# Patient Record
Sex: FEMALE | Race: WHITE | Employment: FULL TIME | URBAN - METROPOLITAN AREA
[De-identification: names, ages, dates, MRNs, and addresses within clinical notes are randomized per-mention and may not be internally consistent; named-entity substitution may affect disease eponyms.]

---

## 2017-04-17 ENCOUNTER — OFFICE VISIT (OUTPATIENT)
Dept: FAMILY MEDICINE | Facility: CLINIC | Age: 37
End: 2017-04-17
Payer: COMMERCIAL

## 2017-04-17 VITALS
BODY MASS INDEX: 39.49 KG/M2 | DIASTOLIC BLOOD PRESSURE: 78 MMHG | OXYGEN SATURATION: 97 % | WEIGHT: 237 LBS | HEART RATE: 108 BPM | TEMPERATURE: 98.2 F | HEIGHT: 65 IN | SYSTOLIC BLOOD PRESSURE: 122 MMHG

## 2017-04-17 DIAGNOSIS — Z00.00 ROUTINE GENERAL MEDICAL EXAMINATION AT A HEALTH CARE FACILITY: ICD-10-CM

## 2017-04-17 DIAGNOSIS — Z13.220 SCREENING FOR LIPID DISORDERS: ICD-10-CM

## 2017-04-17 DIAGNOSIS — Z13.1 SCREENING FOR DIABETES MELLITUS: ICD-10-CM

## 2017-04-17 DIAGNOSIS — Z01.419 ENCOUNTER FOR GYNECOLOGICAL EXAMINATION WITHOUT ABNORMAL FINDING: ICD-10-CM

## 2017-04-17 PROCEDURE — 87624 HPV HI-RISK TYP POOLED RSLT: CPT | Performed by: FAMILY MEDICINE

## 2017-04-17 PROCEDURE — 99385 PREV VISIT NEW AGE 18-39: CPT | Performed by: FAMILY MEDICINE

## 2017-04-17 PROCEDURE — 80061 LIPID PANEL: CPT | Performed by: FAMILY MEDICINE

## 2017-04-17 PROCEDURE — G0145 SCR C/V CYTO,THINLAYER,RESCR: HCPCS | Performed by: FAMILY MEDICINE

## 2017-04-17 PROCEDURE — 36415 COLL VENOUS BLD VENIPUNCTURE: CPT | Performed by: FAMILY MEDICINE

## 2017-04-17 PROCEDURE — 82947 ASSAY GLUCOSE BLOOD QUANT: CPT | Performed by: FAMILY MEDICINE

## 2017-04-17 NOTE — MR AVS SNAPSHOT
After Visit Summary   4/17/2017    Opal Muller    MRN: [id number]           Patient Information     Date Of Birth          1980        Visit Information        Provider Department      4/17/2017 3:40 PM Richar Powell MD [facility]        Today's Diagnoses     Routine general medical examination at a health care facility        Screening for diabetes mellitus        Screening for lipid disorders        Encounter for gynecological examination without abnormal finding          Care Instructions      Preventive Health Recommendations  Female Ages 26 - 39  Yearly exam:   See your health care provider every year in order to    Review health changes.     Discuss preventive care.      Review your medicines if you your doctor has prescribed any.    Until age 30: Get a Pap test every three years (more often if you have had an abnormal result).    After age 30: Talk to your doctor about whether you should have a Pap test every 3 years or have a Pap test with HPV screening every 5 years.   You do not need a Pap test if your uterus was removed (hysterectomy) and you have not had cancer.  You should be tested each year for STDs (sexually transmitted diseases), if you're at risk.   Talk to your provider about how often to have your cholesterol checked.  If you are at risk for diabetes, you should have a diabetes test (fasting glucose).  Shots: Get a flu shot each year. Get a tetanus shot every 10 years.   Nutrition:     Eat at least 5 servings of fruits and vegetables each day.    Eat whole-grain bread, whole-wheat pasta and brown rice instead of white grains and rice.    Talk to your provider about Calcium and Vitamin D.     Lifestyle    Exercise at least 150 minutes a week (30 minutes a day, 5 days of the week). This will help you control your weight and prevent disease.    Limit alcohol to one drink per day.    No smoking.     Wear sunscreen to prevent skin cancer.    See your  "dentist every six months for an exam and cleaning.          Follow-ups after your visit        Who to contact     If you have questions or need follow up information about today's clinic visit or your schedule please contact [facility] directly at [phone].  Normal or non-critical lab and imaging results will be communicated to you by MyChart, letter or phone within 4 business days after the clinic has received the results. If you do not hear from us within 7 days, please contact the clinic through MyChart or phone. If you have a critical or abnormal lab result, we will notify you by phone as soon as possible.  Submit refill requests through [employer] or call your pharmacy and they will forward the refill request to us. Please allow 3 business days for your refill to be completed.          Additional Information About Your Visit        MyChart Information     Wisr lets you send messages to your doctor, view your test results, renew your prescriptions, schedule appointments and more. To sign up, go to [email or web address] . Click on \"Log in\" on the left side of the screen, which will take you to the Welcome page. Then click on \"Sign up Now\" on the right side of the page.     You will be asked to enter the access code listed below, as well as some personal information. Please follow the directions to create your username and password.     Your access code is: [email or web address]  Expires: 8/15/2017  8:32 AM     Your access code will  in 90 days. If you need help or a new code, please call your [facility] clinic or [phone].        Care EveryWhere ID     This is your Care EveryWhere ID. This could be used by other organizations to access your [facility] medical records  [id number]        Your Vitals Were     Pulse Temperature Height Last Period Pulse Oximetry BMI (Body Mass Index)    108 98.2  F (36.8  C) (Tympanic) 5' 5\" (1.651 m) 2017 (Approximate) 97% 39.44 kg/m2       Blood Pressure " from Last 3 Encounters:   04/17/17 122/78    Weight from Last 3 Encounters:   04/17/17 237 lb (107.5 kg)              We Performed the Following     Glucose     HPV High Risk Types DNA Cervical     Lipid Profile (Chol, Trig, HDL, LDL calc)     Pap imaged thin layer screen with HPV - recommended age 30 - 65 years (select HPV order below)        Primary Care Provider Office Phone # Fax #    Deqa Huang Powell [phone]       [facility] [address] [facility] [address]        Thank you!     Thank you for choosing [facility]  for your care. Our goal is always to provide you with excellent care. Hearing back from our patients is one way we can continue to improve our services. Please take a few minutes to complete the written survey that you may receive in the mail after your visit with us. Thank you!             Your Updated Medication List - Protect others around you: Learn how to safely use, store and throw away your medicines at www.disposemymeds.org.      Notice  As of 4/17/2017 11:59 PM    You have not been prescribed any medications.

## 2017-04-17 NOTE — LETTER
May 17, 2017    Opal Muller  [address]  [facility] [address]    Dear Opal,  We are happy to inform you that your PAP smear result from 04/17/17 is normal.  We are now able to do a follow up test on PAP smears. The DNA test is for HPV (Human Papilloma Virus). Cervical cancer is closely linked with certain types of HPV. Your result showed no evidence of high risk HPV.  Therefore we recommend you return in 5 years for your next pap smear and HPV test.  You will still need to return to the clinic every year for an annual exam and other preventive tests.  Please contact the clinic at [phone] with any questions.  Sincerely,    Richar Powell MD/chela

## 2017-04-17 NOTE — LETTER
[facility]   [address] [place]   [address]  [phone]    April 20, 2017      Opal ERMIAS Renzo  [address] [facility] [address]              Dear Ms. Muller,    Here are your results for your recent labs.   Your blood sugar, cholesterol and LDL were all normal.   Please call or message me if you have questions or concerns.    Resulted Orders   Lipid Profile (Chol, Trig, HDL, LDL calc)   Result Value Ref Range    Cholesterol 171 <200 mg/dL    Triglycerides 151 (H) <150 mg/dL      Comment:      Borderline high:  150-199 mg/dl   High:             200-499 mg/dl   Very high:       >499 mg/dl   Fasting specimen      HDL Cholesterol 70 >49 mg/dL    LDL Cholesterol Calculated 71 <100 mg/dL      Comment:      Desirable:       <100 mg/dl    Non HDL Cholesterol 101 <130 mg/dL   Glucose   Result Value Ref Range    Glucose 81 70 - 99 mg/dL      Comment:      Fasting specimen             Sincerely,    Richar Powell MD/nr

## 2017-04-17 NOTE — PROGRESS NOTES
SUBJECTIVE:     CC: Opal Muller is an 36 year old woman who presents for preventive health visit.     Physical   Annual:     Getting at least 3 servings of Calcium per day::  Yes    Bi-annual eye exam::  NO    Dental care twice a year::  NO    Sleep apnea or symptoms of sleep apnea::  None    Diet::  Regular (no restrictions)    Frequency of exercise::  1 day/week    Duration of exercise::  15-30 minutes    Taking medications regularly::  Not Applicable    Additional concerns today::  No        Today's PHQ-2 Score:   PHQ-2 ( 1999 [employer]) 4/17/2017   Little interest or pleasure in doing things Several days   Feeling down, depressed or hopeless Several days   PHQ-2 Score 2       Abuse: Current or Past(Physical, Sexual or Emotional)- No  Do you feel safe in your environment - Yes    Social History   Substance Use Topics     Smoking status: Passive Smoke Exposure - Never Smoker     Smokeless tobacco: Not on file      Comment: quit      Alcohol use Not on file     The patient does not drink >3 drinks per day nor >7 drinks per week.    No results for input(s): CHOL, HDL, LDL, TRIG, CHOLHDLRATIO, NHDL in the last 04850 hours.    Reviewed orders with patient.  Reviewed health maintenance and updated orders accordingly - Yes    Mammo Decision Support:  Mammogram not appropriate for this patient based on age.    Pertinent mammograms are reviewed under the imaging tab.  History of abnormal Pap smear: NO - age 30-65 PAP every 5 years with negative HPV co-testing recommended    Reviewed and updated as needed this visit by clinical staff  Tobacco  Allergies  Med Hx  Surg Hx  Fam Hx  Soc Hx        Reviewed and updated as needed this visit by Provider            ROS:  C: NEGATIVE for fever, chills, change in weight  I: NEGATIVE for worrisome rashes, moles or lesions  E: NEGATIVE for vision changes or irritation  ENT: NEGATIVE for ear, mouth and throat problems  R: NEGATIVE for significant cough or SOB  B: NEGATIVE for  masses, tenderness or discharge  CV: NEGATIVE for chest pain, palpitations or peripheral edema  GI: NEGATIVE for nausea, abdominal pain, heartburn, or change in bowel habits  : NEGATIVE for unusual urinary or vaginal symptoms. Periods are regular.  M: NEGATIVE for significant arthralgias or myalgia  N: NEGATIVE for weakness, dizziness or paresthesias  P: NEGATIVE for changes in mood or affect    Problem list, Medication list, Allergies, and Medical/Social/Surgical histories reviewed in EPIC and updated as appropriate.  OBJECTIVE:     /78 (BP Location: Right arm, Patient Position: Chair, Cuff Size: Adult Regular)  Pulse 108  Temp 98.2  F (36.8  C) (Tympanic)  Wt 237 lb (107.5 kg)  LMP 04/01/2017 (Approximate)  SpO2 97%  EXAM:  GENERAL: healthy, alert and no distress  EYES: Eyes grossly normal to inspection, PERRL and conjunctivae and sclerae normal  HENT: ear canals and TM's normal, nose and mouth without ulcers or lesions  NECK: no adenopathy, no asymmetry, masses, or scars and thyroid normal to palpation  RESP: lungs clear to auscultation - no rales, rhonchi or wheezes  CV: regular rate and rhythm, normal S1 S2, no S3 or S4, no murmur  ABDOMEN: soft, nontender, no hepatosplenomegaly, no masses and bowel sounds normal   (female): normal female external genitalia, normal urethral meatus, vaginal mucosa pink, moist, well rugated, and normal cervix/adnexa/uterus without masses or discharge  MS: no gross musculoskeletal defects noted, no edema  SKIN: no suspicious lesions or rashes  NEURO: Normal strength and tone, mentation intact and speech normal  PSYCH: mentation appears normal, affect normal/bright    ASSESSMENT/PLAN:       1. Routine general medical examination at a health care facility  - see below     2. Screening for diabetes mellitus  - Glucose    3. Screening for lipid disorders  - Lipid Profile (Chol, Trig, HDL, LDL calc)    4. Encounter for gynecological examination without abnormal  finding  - Pap imaged thin layer screen with HPV - recommended age 30 - 65 years (select HPV order below)  - HPV High Risk Types DNA Cervical    COUNSELING:  Reviewed preventive health counseling, as reflected in patient instructions       Regular exercise       Healthy diet/nutrition         reports that she is a non-smoker but has been exposed to tobacco smoke. She does not have any smokeless tobacco history on file.    There is no height or weight on file to calculate BMI.   Weight management plan: Discussed healthy diet and exercise guidelines and patient will follow up in 12 months in clinic to re-evaluate.    Counseling Resources:  ATP IV Guidelines  Pooled Cohorts Equation Calculator  Breast Cancer Risk Calculator  FRAX Risk Assessment  ICSI Preventive Guidelines  Dietary Guidelines for Americans, 2010  [place]'s MyPlate  ASA Prophylaxis  Lung CA Screening    Deisha Powell MD  [facility]  Answers for HPI/ROS submitted by the patient on 4/17/2017   Q1: Little interest or pleasure in doing things: 1=Several days  Q2: Feeling down, depressed or hopeless: 1=Several days  PHQ-2 Score: 2

## 2017-04-17 NOTE — NURSING NOTE
Chief Complaint   Patient presents with     Physical     new patient       Initial /78 (BP Location: Right arm, Patient Position: Chair, Cuff Size: Adult Regular)  Pulse 108  Temp 98.2  F (36.8  C) (Tympanic)  Wt 237 lb (107.5 kg)  LMP 04/01/2017 (Approximate)  SpO2 97% There is no height or weight on file to calculate BMI.  Medication Reconciliation: complete     Elena Wells MA

## 2017-04-18 LAB
CHOLEST SERPL-MCNC: 171 MG/DL
GLUCOSE SERPL-MCNC: 81 MG/DL (ref 70–99)
HDLC SERPL-MCNC: 70 MG/DL
LDLC SERPL CALC-MCNC: 71 MG/DL
NONHDLC SERPL-MCNC: 101 MG/DL
TRIGL SERPL-MCNC: 151 MG/DL

## 2017-04-19 LAB
COPATH REPORT: NORMAL
PAP: NORMAL

## 2017-04-20 NOTE — PROGRESS NOTES
Please send the letter to the patient with the following.         Here are your results for your recent labs.   Your blood sugar, cholesterol and LDL were all normal.   Please call or message me if you have questions or concerns.

## 2017-04-21 ENCOUNTER — TELEPHONE (OUTPATIENT)
Dept: FAMILY MEDICINE | Facility: CLINIC | Age: 37
End: 2017-04-21

## 2017-04-21 LAB
FINAL DIAGNOSIS: NORMAL
HPV HR 12 DNA CVX QL NAA+PROBE: NEGATIVE
HPV16 DNA SPEC QL NAA+PROBE: NEGATIVE
HPV18 DNA SPEC QL NAA+PROBE: NEGATIVE
SPECIMEN DESCRIPTION: NORMAL

## 2017-04-21 NOTE — TELEPHONE ENCOUNTER
Called and left message that forms are completed and place up front for pick-up.    Elena Wells MA

## 2018-04-25 ENCOUNTER — OFFICE VISIT (OUTPATIENT)
Dept: FAMILY MEDICINE | Facility: CLINIC | Age: 38
End: 2018-04-25
Payer: COMMERCIAL

## 2018-04-25 VITALS
RESPIRATION RATE: 16 BRPM | SYSTOLIC BLOOD PRESSURE: 126 MMHG | OXYGEN SATURATION: 100 % | HEART RATE: 91 BPM | HEIGHT: 65 IN | TEMPERATURE: 98.3 F | BODY MASS INDEX: 39.32 KG/M2 | DIASTOLIC BLOOD PRESSURE: 89 MMHG | WEIGHT: 236 LBS

## 2018-04-25 DIAGNOSIS — Z13.1 SCREENING FOR DIABETES MELLITUS: ICD-10-CM

## 2018-04-25 DIAGNOSIS — Z13.6 CARDIOVASCULAR SCREENING; LDL GOAL LESS THAN 130: ICD-10-CM

## 2018-04-25 DIAGNOSIS — E66.812 CLASS 2 OBESITY WITHOUT SERIOUS COMORBIDITY WITH BODY MASS INDEX (BMI) OF 38.0 TO 38.9 IN ADULT, UNSPECIFIED OBESITY TYPE: ICD-10-CM

## 2018-04-25 DIAGNOSIS — Z00.00 ENCOUNTER FOR ROUTINE ADULT HEALTH EXAMINATION WITHOUT ABNORMAL FINDINGS: Primary | ICD-10-CM

## 2018-04-25 PROCEDURE — 99395 PREV VISIT EST AGE 18-39: CPT | Performed by: NURSE PRACTITIONER

## 2018-04-25 PROCEDURE — 82947 ASSAY GLUCOSE BLOOD QUANT: CPT | Performed by: NURSE PRACTITIONER

## 2018-04-25 PROCEDURE — 80061 LIPID PANEL: CPT | Performed by: NURSE PRACTITIONER

## 2018-04-25 PROCEDURE — 36415 COLL VENOUS BLD VENIPUNCTURE: CPT | Performed by: NURSE PRACTITIONER

## 2018-04-25 NOTE — LETTER
April 26, 2018      Opal Muller  [address]  [facility] [address]        Dear ,    We are writing to inform you of your test results.    Your blood sugar was normal indicating you do not have diabetes.    Normal cholesterol.    Resulted Orders   Lipid panel reflex to direct LDL Fasting   Result Value Ref Range    Cholesterol 174 <200 mg/dL    Triglycerides 177 (H) <150 mg/dL      Comment:      Borderline high:  150-199 mg/dl  High:             200-499 mg/dl  Very high:       >499 mg/dl  Non Fasting      HDL Cholesterol 67 >49 mg/dL    LDL Cholesterol Calculated 72 <100 mg/dL      Comment:      Desirable:       <100 mg/dl    Non HDL Cholesterol 107 <130 mg/dL   Glucose   Result Value Ref Range    Glucose 88 70 - 99 mg/dL      Comment:      Non Fasting     If you have any questions or concerns, please call the clinic at the number listed above.     Sincerely,    Leatha Huitron, KIKE CNP/nr

## 2018-04-25 NOTE — PROGRESS NOTES
SUBJECTIVE:   CC: Skye Muller is an 38 year old woman who presents for preventive health visit.     Answers for HPI/ROS submitted by the patient on 2018   Annual Exam:  Getting at least 3 servings of Calcium per day:: NO  Bi-annual eye exam:: NO  Dental care twice a year:: NO  Sleep apnea or symptoms of sleep apnea:: None  Diet:: Regular (no restrictions), Other  Frequency of exercise:: None  Taking medications regularly:: Not Applicable  Medication side effects:: Not applicable  Additional concerns today:: No  PHQ-2 Score: 0      Pap 1 year ago, NIL neg HPV.      No significant medical history.  Tonsils out age 6.   13 years ago.      Physical job, works weird hours, hard to find time to exercise.  Works as replenishment manager, trucks come at 3am.  Weight is struggle for her since first child.      4 kids ages 22-13.  Sexually active, s/p tubal ligation      Today's PHQ-2 Score: No flowsheet data found.    Abuse: Current or Past(Physical, Sexual or Emotional)- No  Do you feel safe in your environment - Yes    Social History   Substance Use Topics     Smoking status: Not on file     Smokeless tobacco: Not on file     Alcohol use Not on file     If you drink alcohol do you typically have >3 drinks per day or >7 drinks per week? No                     Reviewed orders with patient.  Reviewed health maintenance and updated orders accordingly - Yes      Patient under age 50, mutual decision reflected in health maintenance.      Pertinent mammograms are reviewed under the imaging tab.  History of abnormal Pap smear: NO - age 30-65 PAP every 5 years with negative HPV co-testing recommended    Reviewed and updated as needed this visit by clinical staff         Reviewed and updated as needed this visit by Provider        No past medical history on file.   No past surgical history on file.    ROS:  CONSTITUTIONAL: NEGATIVE for fever, chills, change in weight  INTEGUMENTARU/SKIN: NEGATIVE for worrisome  rashes, moles or lesions  EYES: NEGATIVE for vision changes or irritation  ENT: NEGATIVE for ear, mouth and throat problems  RESP: NEGATIVE for significant cough or SOB  BREAST: NEGATIVE for masses, tenderness or discharge  CV: NEGATIVE for chest pain, palpitations or peripheral edema  GI: NEGATIVE for nausea, abdominal pain, heartburn, or change in bowel habits  : NEGATIVE for unusual urinary or vaginal symptoms. Periods are regular.  MUSCULOSKELETAL: NEGATIVE for significant arthralgias or myalgia  NEURO: NEGATIVE for weakness, dizziness or paresthesias  PSYCHIATRIC: NEGATIVE for changes in mood or affect    OBJECTIVE:   There were no vitals taken for this visit.  EXAM:  GENERAL: healthy, alert and no distress  EYES: Eyes grossly normal to inspection, PERRL and conjunctivae and sclerae normal  HENT: ear canals and TM's normal, nose and mouth without ulcers or lesions  NECK: no adenopathy, no asymmetry, masses, or scars and thyroid normal to palpation  RESP: lungs clear to auscultation - no rales, rhonchi or wheezes  BREAST: normal without masses, tenderness or nipple discharge and no palpable axillary masses or adenopathy  CV: regular rate and rhythm, normal S1 S2, no S3 or S4, no murmur, click or rub, no peripheral edema and peripheral pulses strong  ABDOMEN: soft, nontender, no hepatosplenomegaly, no masses and bowel sounds normal  MS: no gross musculoskeletal defects noted, no edema  SKIN: no suspicious lesions or rashes  NEURO: Normal strength and tone, mentation intact and speech normal  PSYCH: mentation appears normal, affect normal/bright    ASSESSMENT/PLAN:   (Z00.00) Encounter for routine adult health examination without abnormal findings  (primary encounter diagnosis)  Plan: pap utd, discussed different mammogram recommendations    (Z13.6) CARDIOVASCULAR SCREENING; LDL GOAL LESS THAN 130  Plan: Lipid panel reflex to direct LDL Fasting            (Z13.1) Screening for diabetes mellitus  Plan: Glucose             (E66.9,  Z68.38) Class 2 obesity without serious comorbidity with body mass index (BMI) of 38.0 to 38.9 in adult, unspecified obesity type  Plan: we had a long discussion regarding weight loss to prevent progression to diabetes.  Recommended focusing on portion control and minimizing carb intake, reviewed nutrition resource including my fitness pal, whole 30, and online weight watchers.  Recommended 30min exercise most days a week.  Discussed weight loss of 0.5-1 pound a week with overall goal of 10% weight loss.            COUNSELING:   Reviewed preventive health counseling, as reflected in patient instructions         has no tobacco history on file.    There is no height or weight on file to calculate BMI.   Weight management plan: Discussed healthy diet and exercise guidelines and patient will follow up in 12 months in clinic to re-evaluate.    Counseling Resources:  ATP IV Guidelines  Pooled Cohorts Equation Calculator  Breast Cancer Risk Calculator  FRAX Risk Assessment  ICSI Preventive Guidelines  Dietary Guidelines for Americans, 2010  USDA's MyPlate  ASA Prophylaxis  Lung CA Screening    KIKE Mendes CNP  [facility]

## 2018-04-25 NOTE — PATIENT INSTRUCTIONS
1.  Labs today  2.  Consider mammogram starting at age 40.  Call Central Scheduling [phone] to schedule mammogram  3.  The best thing you can do for your health is work on weight loss.  I recommend setting a goal of 10-20 pounds weight loss and picking a measurable structured approach.  I have had patients have success with calorie counting (my fitness pal phone chintan), online weight watchers, low carb diets, and whole 30 program.  It might take trying a few diets before finding what works for you.  Most of us need to focus on increasing vegetable intake, limiting portion size, and limiting convenience foods.      Let me know if you would like to meet with a nutritionist or the weight loss clinic.        Preventive Health Recommendations  Female Ages 26 - 39  Yearly exam:   See your health care provider every year in order to    Review health changes.     Discuss preventive care.      Review your medicines if you your doctor has prescribed any.    Until age 30: Get a Pap test every three years (more often if you have had an abnormal result).    After age 30: Talk to your doctor about whether you should have a Pap test every 3 years or have a Pap test with HPV screening every 5 years.   You do not need a Pap test if your uterus was removed (hysterectomy) and you have not had cancer.  You should be tested each year for STDs (sexually transmitted diseases), if you're at risk.   Talk to your provider about how often to have your cholesterol checked.  If you are at risk for diabetes, you should have a diabetes test (fasting glucose).  Shots: Get a flu shot each year. Get a tetanus shot every 10 years.   Nutrition:     Eat at least 5 servings of fruits and vegetables each day.    Eat whole-grain bread, whole-wheat pasta and brown rice instead of white grains and rice.    Talk to your provider about Calcium and Vitamin D.     Lifestyle    Exercise at least 150 minutes a week (30 minutes a day, 5 days of the week). This  will help you control your weight and prevent disease.    Limit alcohol to one drink per day.    No smoking.     Wear sunscreen to prevent skin cancer.    See your dentist every six months for an exam and cleaning.

## 2018-04-25 NOTE — MR AVS SNAPSHOT
After Visit Summary   4/25/2018    Opal Muller    MRN: [id number]           Patient Information     Date Of Birth          1980        Visit Information        Provider Department      4/25/2018 2:20 PM Leatha Huitron APRN [facility]        Today's Diagnoses     CARDIOVASCULAR SCREENING; LDL GOAL LESS THAN 130    -  1    Screening for diabetes mellitus          Care Instructions    1.  Labs today  2.  Consider mammogram starting at age 40.  Call Central Scheduling [phone] to schedule mammogram  3.  The best thing you can do for your health is work on weight loss.  I recommend setting a goal of 10-20 pounds weight loss and picking a measurable structured approach.  I have had patients have success with calorie counting (my fitness pal phone chintan), online weight watchers, low carb diets, and whole 30 program.  It might take trying a few diets before finding what works for you.  Most of us need to focus on increasing vegetable intake, limiting portion size, and limiting convenience foods.      Let me know if you would like to meet with a nutritionist or the weight loss clinic.        Preventive Health Recommendations  Female Ages 26 - 39  Yearly exam:   See your health care provider every year in order to    Review health changes.     Discuss preventive care.      Review your medicines if you your doctor has prescribed any.    Until age 30: Get a Pap test every three years (more often if you have had an abnormal result).    After age 30: Talk to your doctor about whether you should have a Pap test every 3 years or have a Pap test with HPV screening every 5 years.   You do not need a Pap test if your uterus was removed (hysterectomy) and you have not had cancer.  You should be tested each year for STDs (sexually transmitted diseases), if you're at risk.   Talk to your provider about how often to have your cholesterol checked.  If you are at risk for diabetes, you  "should have a diabetes test (fasting glucose).  Shots: Get a flu shot each year. Get a tetanus shot every 10 years.   Nutrition:     Eat at least 5 servings of fruits and vegetables each day.    Eat whole-grain bread, whole-wheat pasta and brown rice instead of white grains and rice.    Talk to your provider about Calcium and Vitamin D.     Lifestyle    Exercise at least 150 minutes a week (30 minutes a day, 5 days of the week). This will help you control your weight and prevent disease.    Limit alcohol to one drink per day.    No smoking.     Wear sunscreen to prevent skin cancer.    See your dentist every six months for an exam and cleaning.            Follow-ups after your visit        Who to contact     If you have questions or need follow up information about today's clinic visit or your schedule please contact [facility] directly at [phone].  Normal or non-critical lab and imaging results will be communicated to you by MyChart, letter or phone within 4 business days after the clinic has received the results. If you do not hear from us within 7 days, please contact the clinic through [employer] or phone. If you have a critical or abnormal lab result, we will notify you by phone as soon as possible.  Submit refill requests through [employer] or call your pharmacy and they will forward the refill request to us. Please allow 3 business days for your refill to be completed.          Additional Information About Your Visit        MyChart Information     [employer] lets you send messages to your doctor, view your test results, renew your prescriptions, schedule appointments and more. To sign up, go to [email or web address] . Click on \"Log in\" on the left side of the screen, which will take you to the Welcome page. Then click on \"Sign up Now\" on the right side of the page.     You will be asked to enter the access code listed below, as well as some personal information. Please follow the directions to " "create your username and password.     Your access code is: [email or web address]  Expires: 2018  2:49 PM     Your access code will  in 90 days. If you need help or a new code, please call your [place] clinic or [phone].        Care EveryWhere ID     This is your Care EveryWhere ID. This could be used by other organizations to access your [place] medical records  [id number]        Your Vitals Were     Pulse Temperature Respirations Height Pulse Oximetry BMI (Body Mass Index)    91 98.3  F (36.8  C) (Oral) 16 5' 5.25\" (1.657 m) 100% 38.97 kg/m2       Blood Pressure from Last 3 Encounters:   18 126/89   17 122/78    Weight from Last 3 Encounters:   18 236 lb (107 kg)   17 237 lb (107.5 kg)              We Performed the Following     Glucose     Lipid panel reflex to direct LDL Fasting        Primary Care Provider Office Phone # Fax #    Deqa Huang Powell [phone]       [address]  [facility] [address]        Equal Access to Services     [facility] : Hadii bryant ferraroo Soelias, waaxda luqadaha, qaybta kaalmada adehoseayada, denny orosco . So [facility] [phone].    ATENCIÓN: Si habla español, tiene a roman disposición servicios gratuitos de asistencia lingüística. [facility] [phone].    We comply with applicable federal civil rights laws and [place] laws. We do not discriminate on the basis of race, color, national origin, age, disability, sex, sexual orientation, or gender identity.            Thank you!     Thank you for choosing [facility]  for your care. Our goal is always to provide you with excellent care. Hearing back from our patients is one way we can continue to improve our services. Please take a few minutes to complete the written survey that you may receive in the mail after your visit with us. Thank you!             Your Updated Medication List - Protect others around you: Learn how to safely use, " store and throw away your medicines at www.disposemymeds.org.      Notice  As of 4/25/2018  2:49 PM    You have not been prescribed any medications.

## 2018-04-26 LAB
CHOLEST SERPL-MCNC: 174 MG/DL
GLUCOSE SERPL-MCNC: 88 MG/DL (ref 70–99)
HDLC SERPL-MCNC: 67 MG/DL
LDLC SERPL CALC-MCNC: 72 MG/DL
NONHDLC SERPL-MCNC: 107 MG/DL
TRIGL SERPL-MCNC: 177 MG/DL

## 2018-04-26 NOTE — PROGRESS NOTES
Please send out result letter with the following note, thanks.    Your blood sugar was normal indicating you do not have diabetes.    Normal cholesterol.    -Leatha Huitron, CNP

## 2019-04-29 NOTE — PROGRESS NOTES
"   SUBJECTIVE:   CC: Opal Muller is an 38 year old woman who presents for preventive health visit.     Answers for HPI/ROS submitted by the patient on 4/30/2019   Annual Exam:  Frequency of exercise:: 1 day/week  Getting at least 3 servings of Calcium per day:: Yes  Diet:: Regular (no restrictions)  Taking medications regularly:: Not Applicable  Medication side effects:: Not applicable  Bi-annual eye exam:: NO  Dental care twice a year:: NO  Sleep apnea or symptoms of sleep apnea:: None  abdominal pain: No  Blood in stool: No  Blood in urine: No  chest pain: No  chills: No  congestion: No  constipation: No  cough: Yes  diarrhea: No  dizziness: No  ear pain: No  eye pain: No  nervous/anxious: No  fever: No  frequency: No  genital sores: No  headaches: No  hearing loss: No  heartburn: No  arthralgias: No  joint swelling: No  peripheral edema: No  mood changes: No  myalgias: No  nausea: No  dysuria: No  palpitations: No  Skin sensation changes: No  sore throat: No  urgency: No  rash: No  shortness of breath: No  visual disturbance: No  weakness: No  pelvic pain: No  vaginal bleeding: No  vaginal discharge: No  tenderness: No  breast mass: No  breast discharge: No  Additional concerns today:: No  Duration of exercise:: N/A      Acute concerns:  Pt c/o dry throat, started once the floors at her job were refinished. Denies cough, just a need to clear her throat frequently. Does notice it happens more at work, but also happens at home. Denies congestion, sore throat, or runny nose. No recent URI's. Doesn't worsen in morning or night. Has tried Claritin for \"a few weeks\" with no relief of sx, allegra tried for 3-4 days and caused nasal congestion. Denies heartburn. Does not worsen with talking. Denies chest pain, SOB, fever/chills, and morning hoarseness.    Weight loss continues to be a struggle for patient due to being busy and not finding time for exercise.     Chronic problems:  none    Health " Screening:  tdap  labs  S/p tubal ligation.  Mammogram- discussed with patient, says she will probably start at age 50. No first degree relatives with breast cancer, does not smoke, obesity is risk factor.    Works at [employer] and [email or web address]. Works odd hours.       Today's PHQ-2 Score:   PHQ-2 ( 1999 Pfizer) 4/30/2019 4/17/2017   Q1: Little interest or pleasure in doing things 0 -   Q2: Feeling down, depressed or hopeless 0 -   PHQ-2 Score 0 -   Q1: Little interest or pleasure in doing things Not at all Several days   Q2: Feeling down, depressed or hopeless Not at all Several days   PHQ-2 Score 0 2       Abuse: Current or Past(Physical, Sexual or Emotional)- No  Do you feel safe in your environment? Yes    Social History     Tobacco Use     Smoking status: Passive Smoke Exposure - Never Smoker     Smokeless tobacco: Never Used     Tobacco comment: smoked a few cigarettes occasionally as teenager for less than 1 year, quit    Substance Use Topics     Alcohol use: Yes     Comment: occasional glass of wine     If you drink alcohol do you typically have >3 drinks per day or >7 drinks per week? Not Applicable                     Reviewed orders with patient.  Reviewed health maintenance and updated orders accordingly - Yes  BP Readings from Last 3 Encounters:   04/30/19 134/76   04/25/18 126/89   04/17/17 122/78    Wt Readings from Last 3 Encounters:   04/30/19 103.4 kg (228 lb)   04/25/18 107 kg (236 lb)   04/17/17 107.5 kg (237 lb)                Mammogram Screening: Patient under age 50, mutual decision reflected in health maintenance.      Pertinent mammograms are reviewed under the imaging tab.  History of abnormal Pap smear: NO - age 30-65 PAP every 5 years with negative HPV co-testing recommended  PAP / HPV Latest Ref Rng & Units 4/17/2017   PAP - NIL   HPV 16 DNA NEG Negative   HPV 18 DNA NEG Negative   OTHER HR HPV NEG Negative     Reviewed and updated as needed this visit by clinical  "staff  Tobacco  Allergies  Meds  Med Hx  Surg Hx  Fam Hx  Soc Hx        Reviewed and updated as needed this visit by Provider        Past Medical History:   Diagnosis Date     NO ACTIVE PROBLEMS         ROS:  As above     OBJECTIVE:   /76 (BP Location: Right arm, Patient Position: Chair, Cuff Size: Adult Large)   Pulse 86   Temp 98.9  F (37.2  C) (Oral)   Resp 12   Ht 1.664 m (5' 5.5\")   Wt 103.4 kg (228 lb)   SpO2 100%   BMI 37.36 kg/m    EXAM:  GENERAL: healthy, alert and no distress  EYES: Eyes grossly normal to inspection, PERRL and conjunctivae and sclerae normal  HENT: normal cephalic/atraumatic, ear canals and TM's normal, nose and mouth without ulcers or lesions, oropharynx clear, oral mucous membranes moist and cobblestone appearance on throat  NECK: no adenopathy, no asymmetry, masses, or scars and thyroid normal to palpation  RESP: lungs clear to auscultation - no rales, rhonchi or wheezes  BREAST: normal without masses, tenderness or nipple discharge and no palpable axillary masses or adenopathy  CV: regular rate and rhythm, normal S1 S2, no S3 or S4, no murmur, click or rub, no peripheral edema and peripheral pulses strong  ABDOMEN: soft, nontender, no hepatosplenomegaly, no masses and bowel sounds normal  MS: no gross musculoskeletal defects noted, no edema  SKIN: no suspicious lesions or rashes  NEURO: Normal strength and tone, mentation intact and speech normal  PSYCH: mentation appears normal, affect normal/bright    Diagnostic Test Results:  none     ASSESSMENT/PLAN:   (Z00.00) Encounter for routine adult health examination without abnormal findings  (primary encounter diagnosis)  Comment: Routine health exam  Plan: pap utd, discussed different mammogram recommendations, pt interested in doing at a later time  Continue working on weight loss/exercise routine    (J39.2) Throat irritation  Comment: hx of seasonal allergies, likely could be the cause of throat irritation due to " "season, although antihistamine was not helpful. Denies heartburn sx, unlikely GERD. Denies throat drainage, seems like PND unlikely.   Plan: Trial Flonase daily, humidifier at night, if not improving consider ENT follow up     (Z13.6) CARDIOVASCULAR SCREENING; LDL GOAL LESS THAN 130  Comment: Routine screening  Plan: Lipid panel reflex to direct LDL Fasting    (Z13.1) Screening for diabetes mellitus  Comment: routine screening  Plan: Glucose    (Z23) Need for Tdap vaccination  Comment: Needs TDAP to be UTD on immunization  Plan: TDAP, IM (10 - 64 YRS) - Adacel, ADMIN 1st         VACCINE        TDAP received today        COUNSELING:   Reviewed preventive health counseling, as reflected in patient instructions  Special attention given to:        Regular exercise       Healthy diet/nutrition       mammogram    BP Readings from Last 1 Encounters:   04/30/19 134/76     Estimated body mass index is 37.36 kg/m  as calculated from the following:    Height as of this encounter: 1.664 m (5' 5.5\").    Weight as of this encounter: 103.4 kg (228 lb).    BP Screening:   Last 3 BP Readings:    BP Readings from Last 3 Encounters:   04/30/19 134/76   04/25/18 126/89   04/17/17 122/78       The following was recommended to the patient:  Re-screen BP within a year and recommended lifestyle modifications  Weight management plan: Discussed healthy diet and exercise guidelines     reports that she is a non-smoker but has been exposed to tobacco smoke. She has never used smokeless tobacco.      Counseling Resources:  ATP IV Guidelines  Pooled Cohorts Equation Calculator  Breast Cancer Risk Calculator  FRAX Risk Assessment  ICSI Preventive Guidelines  Dietary Guidelines for Americans, 2010  USDA's MyPlate  ASA Prophylaxis  Lung CA Screening    KIKE Mendes [facility]  "

## 2019-04-29 NOTE — PATIENT INSTRUCTIONS
1.  For dry throat try flonase once a day for 2 weeks.  Consider trying humidifier at night.  If not improving could consider follow up with ENT.    2.  Update tdap today  3.  Labs    Preventive Health Recommendations  Female Ages 40 to 49    Yearly exam:     See your health care provider every year in order to  1. Review health changes.   2. Discuss preventive care.    3. Review your medicines if your doctor prescribed any.      Get a Pap test every three years (unless you have an abnormal result and your provider advises testing more often).      If you get Pap tests with HPV test, you only need to test every 5 years, unless you have an abnormal result. You do not need a Pap test if your uterus was removed (hysterectomy) and you have not had cancer.      You should be tested each year for STDs (sexually transmitted diseases), if you're at risk.     Ask your doctor if you should have a mammogram.      Have a colonoscopy (test for colon cancer) if someone in your family has had colon cancer or polyps before age 50.       Have a cholesterol test every 5 years.       Have a diabetes test (fasting glucose) after age 45. If you are at risk for diabetes, you should have this test every 3 years.    Shots: Get a flu shot each year. Get a tetanus shot every 10 years.     Nutrition:     Eat at least 5 servings of fruits and vegetables each day.    Eat whole-grain bread, whole-wheat pasta and brown rice instead of white grains and rice.    Get adequate Calcium and Vitamin D.      Lifestyle    Exercise at least 150 minutes a week (an average of 30 minutes a day, 5 days a week). This will help you control your weight and prevent disease.    Limit alcohol to one drink per day.    No smoking.     Wear sunscreen to prevent skin cancer.    See your dentist every six months for an exam and cleaning.

## 2019-04-30 ENCOUNTER — OFFICE VISIT (OUTPATIENT)
Dept: FAMILY MEDICINE | Facility: CLINIC | Age: 39
End: 2019-04-30
Payer: COMMERCIAL

## 2019-04-30 VITALS
RESPIRATION RATE: 12 BRPM | SYSTOLIC BLOOD PRESSURE: 134 MMHG | HEART RATE: 86 BPM | TEMPERATURE: 98.9 F | OXYGEN SATURATION: 100 % | HEIGHT: 66 IN | DIASTOLIC BLOOD PRESSURE: 76 MMHG | WEIGHT: 228 LBS | BODY MASS INDEX: 36.64 KG/M2

## 2019-04-30 DIAGNOSIS — Z13.6 CARDIOVASCULAR SCREENING; LDL GOAL LESS THAN 130: ICD-10-CM

## 2019-04-30 DIAGNOSIS — Z13.1 SCREENING FOR DIABETES MELLITUS: ICD-10-CM

## 2019-04-30 DIAGNOSIS — Z00.00 ENCOUNTER FOR ROUTINE ADULT HEALTH EXAMINATION WITHOUT ABNORMAL FINDINGS: Primary | ICD-10-CM

## 2019-04-30 DIAGNOSIS — Z23 NEED FOR TDAP VACCINATION: ICD-10-CM

## 2019-04-30 DIAGNOSIS — J39.2 THROAT IRRITATION: ICD-10-CM

## 2019-04-30 PROCEDURE — 36415 COLL VENOUS BLD VENIPUNCTURE: CPT | Performed by: NURSE PRACTITIONER

## 2019-04-30 PROCEDURE — 90715 TDAP VACCINE 7 YRS/> IM: CPT | Performed by: NURSE PRACTITIONER

## 2019-04-30 PROCEDURE — 82947 ASSAY GLUCOSE BLOOD QUANT: CPT | Performed by: NURSE PRACTITIONER

## 2019-04-30 PROCEDURE — 99396 PREV VISIT EST AGE 40-64: CPT | Mod: 25 | Performed by: NURSE PRACTITIONER

## 2019-04-30 PROCEDURE — 80061 LIPID PANEL: CPT | Performed by: NURSE PRACTITIONER

## 2019-04-30 PROCEDURE — 90471 IMMUNIZATION ADMIN: CPT | Performed by: NURSE PRACTITIONER

## 2019-04-30 ASSESSMENT — MIFFLIN-ST. JEOR: SCORE: 1713.01

## 2019-04-30 ASSESSMENT — ENCOUNTER SYMPTOMS
WEAKNESS: 0
COUGH: 1
EYE PAIN: 0
DIZZINESS: 0
CHILLS: 0
DIARRHEA: 0
FREQUENCY: 0
HEADACHES: 0
SHORTNESS OF BREATH: 0
HEMATOCHEZIA: 0
SORE THROAT: 0
ABDOMINAL PAIN: 0
HEARTBURN: 0
ARTHRALGIAS: 0
MYALGIAS: 0
NAUSEA: 0
JOINT SWELLING: 0
PARESTHESIAS: 0
BREAST MASS: 0
NERVOUS/ANXIOUS: 0
CONSTIPATION: 0
PALPITATIONS: 0
DYSURIA: 0
HEMATURIA: 0
FEVER: 0

## 2019-04-30 NOTE — LETTER
May 1, 2019      Opal Muller  [address]  [facility] [address]        Dear ,    We are writing to inform you of your test results.    Normal blood sugar and cholesterol, faxing the insurance form today.     Resulted Orders   Lipid panel reflex to direct LDL Fasting   Result Value Ref Range    Cholesterol 191 <200 mg/dL    Triglycerides 104 <150 mg/dL      Comment:      Non Fasting    HDL Cholesterol 75 >49 mg/dL    LDL Cholesterol Calculated 95 <100 mg/dL      Comment:      Desirable:       <100 mg/dl    Non HDL Cholesterol 116 <130 mg/dL   Glucose   Result Value Ref Range    Glucose 81 70 - 99 mg/dL      Comment:      Non Fasting       If you have any questions or concerns, please call the clinic at the number listed above.       Sincerely,    Leatha Huitron, APRN CNP/nr

## 2019-05-01 ENCOUNTER — TELEPHONE (OUTPATIENT)
Dept: FAMILY MEDICINE | Facility: CLINIC | Age: 39
End: 2019-05-01

## 2019-05-01 LAB
CHOLEST SERPL-MCNC: 191 MG/DL
GLUCOSE SERPL-MCNC: 81 MG/DL (ref 70–99)
HDLC SERPL-MCNC: 75 MG/DL
LDLC SERPL CALC-MCNC: 95 MG/DL
NONHDLC SERPL-MCNC: 116 MG/DL
TRIGL SERPL-MCNC: 104 MG/DL

## 2019-05-01 NOTE — TELEPHONE ENCOUNTER
Faxed for to fax number on form oked per patient and original put at front for patient to   Put a copy in faxed folder for Leatha Huitron  Fax # [phone]

## 2019-05-01 NOTE — RESULT ENCOUNTER NOTE
Please send out result letter with the following note, thanks.    Sushila,    Normal blood sugar and cholesterol, faxing the insurance form today.    -Leatha Huitron, CNP

## 2021-08-17 ENCOUNTER — OFFICE VISIT (OUTPATIENT)
Dept: FAMILY MEDICINE | Facility: CLINIC | Age: 41
End: 2021-08-17
Payer: COMMERCIAL

## 2021-08-17 VITALS
HEART RATE: 87 BPM | SYSTOLIC BLOOD PRESSURE: 122 MMHG | BODY MASS INDEX: 35.99 KG/M2 | TEMPERATURE: 97.6 F | HEIGHT: 65 IN | WEIGHT: 216 LBS | DIASTOLIC BLOOD PRESSURE: 78 MMHG | OXYGEN SATURATION: 98 %

## 2021-08-17 DIAGNOSIS — Z11.4 SCREENING FOR HIV (HUMAN IMMUNODEFICIENCY VIRUS): ICD-10-CM

## 2021-08-17 DIAGNOSIS — Z00.00 ENCOUNTER FOR ROUTINE ADULT HEALTH EXAMINATION WITHOUT ABNORMAL FINDINGS: Primary | ICD-10-CM

## 2021-08-17 DIAGNOSIS — Z82.49 FAMILY HISTORY OF ABDOMINAL AORTIC ANEURYSM (AAA): ICD-10-CM

## 2021-08-17 DIAGNOSIS — Z12.31 ENCOUNTER FOR SCREENING MAMMOGRAM FOR BREAST CANCER: ICD-10-CM

## 2021-08-17 DIAGNOSIS — Z11.59 NEED FOR HEPATITIS C SCREENING TEST: ICD-10-CM

## 2021-08-17 DIAGNOSIS — R13.19 ESOPHAGEAL DYSPHAGIA: ICD-10-CM

## 2021-08-17 PROCEDURE — 99396 PREV VISIT EST AGE 40-64: CPT | Performed by: PHYSICIAN ASSISTANT

## 2021-08-17 PROCEDURE — 99213 OFFICE O/P EST LOW 20 MIN: CPT | Mod: 25 | Performed by: PHYSICIAN ASSISTANT

## 2021-08-17 PROCEDURE — 87389 HIV-1 AG W/HIV-1&-2 AB AG IA: CPT | Performed by: PHYSICIAN ASSISTANT

## 2021-08-17 PROCEDURE — 86803 HEPATITIS C AB TEST: CPT | Performed by: PHYSICIAN ASSISTANT

## 2021-08-17 PROCEDURE — 36415 COLL VENOUS BLD VENIPUNCTURE: CPT | Performed by: PHYSICIAN ASSISTANT

## 2021-08-17 PROCEDURE — 80061 LIPID PANEL: CPT | Performed by: PHYSICIAN ASSISTANT

## 2021-08-17 PROCEDURE — 82947 ASSAY GLUCOSE BLOOD QUANT: CPT | Performed by: PHYSICIAN ASSISTANT

## 2021-08-17 ASSESSMENT — ENCOUNTER SYMPTOMS
FEVER: 0
BREAST MASS: 0
JOINT SWELLING: 1
MUSCULOSKELETAL NEGATIVE: 1
COUGH: 0
PARESTHESIAS: 0
CHILLS: 0
PSYCHIATRIC NEGATIVE: 1
EYES NEGATIVE: 1
PALPITATIONS: 0
MYALGIAS: 1
DIARRHEA: 0
ABDOMINAL PAIN: 1
NAUSEA: 0
RESPIRATORY NEGATIVE: 1
HEMATOCHEZIA: 0
HEMATURIA: 0
CONSTIPATION: 0
WEAKNESS: 0
HEARTBURN: 1
CONSTITUTIONAL NEGATIVE: 1
SHORTNESS OF BREATH: 0
DIZZINESS: 0
NEUROLOGICAL NEGATIVE: 1
NERVOUS/ANXIOUS: 0
SORE THROAT: 0
CARDIOVASCULAR NEGATIVE: 1
EYE PAIN: 0
HEADACHES: 0
ARTHRALGIAS: 1
DYSURIA: 0
FREQUENCY: 0

## 2021-08-17 ASSESSMENT — MIFFLIN-ST. JEOR: SCORE: 1642.55

## 2021-08-17 NOTE — PATIENT INSTRUCTIONS
Call to schedule your aortic aneurysm ultrasound at [facility]:  [phone]        You are due for a mammogram, which is a study to screen for breast cancer.  [place] has several Breast Centers and also a walk-in mammogram service (no appointment needed!) at our [facility] location.  Below is the contact information.  Please complete the mammogram at your earliest convenience.    [facility] walk-in mammogram services:  Address:   [facility] [address] [place] [id number]  Hours: M/Tu/Th 7:00AM-7:00PM, W/F 7:00AM-5:00PM    [facility]  [address]  [address]  [place] [address]  Appointments: [phone] or [phone]  Hours: M-F 7:00AM-5:00PM    [facility]  [address] [facility]  [address]  [place] [address]  Appointments: [phone] or [phone]

## 2021-08-17 NOTE — LETTER
August 19, 2021      Opal Muller  [address] [facility] [address]        Dear ,    We are writing to inform you of your test results.    Your test results fall within the expected range(s) or remain unchanged from previous results.  Please continue with current treatment plan.    Resulted Orders   Lipid panel reflex to direct LDL Fasting   Result Value Ref Range    Cholesterol 186 <200 mg/dL      Comment:      Age 0-19 years  Desirable: <170 mg/dL  Borderline high:  170-199 mg/dl  High:            >199 mg/dl    Age 20 years and older  Desirable: <200 mg/dL    Triglycerides 108 <150 mg/dL      Comment:      0-9 years:  Normal:    Less than 75 mg/dL  Borderline high:  75-99 mg/dL  High:             Greater than or equal to 100 mg/dL    0-19 years:  Normal:    Less than 90 mg/dL  Borderline high:   mg/dL  High:             Greater than or equal to 130 mg/dL    20 years and older:  Normal:    Less than 150 mg/dL  Borderline high:  150-199 mg/dL  High:             200-499 mg/dL  Very high:   Greater than or equal to 500 mg/dL    Direct Measure HDL 67 >=50 mg/dL      Comment:      0-19 years:       Greater than or equal to 45 mg/dL   Low: Less than 40 mg/dL   Borderline low: 40-44 mg/dL     20 years and older:   Female: Greater than or equal to 50 mg/dL   Male:   Greater than or equal to 40 mg/dL         LDL Cholesterol Calculated 97 <=100 mg/dL      Comment:      Age 0-19 years:  Desirable: 0-110 mg/dL   Borderline high: 110-129 mg/dL   High: >= 130 mg/dL    Age 20 years and older:  Desirable: <100mg/dL  Above desirable: 100-129 mg/dL   Borderline high: 130-159 mg/dL   High: 160-189 mg/dL   Very high: >= 190 mg/dL    Non HDL Cholesterol 119 <130 mg/dL      Comment:      0-19 years:  Desirable:          Less than 120 mg/dL  Borderline high:   120-144 mg/dL  High:                   Greater than or equal to 145 mg/dL    20 years and older:  Desirable:          130 mg/dL  Above  Desirable: 130-159 mg/dL  Borderline high:   160-189 mg/dL  High:               190-219 mg/dL  Very high:     Greater than or equal to 220 mg/dL    Patient Fasting > 8hrs? Yes    Glucose   Result Value Ref Range    Glucose 95 70 - 99 mg/dL    Patient Fasting > 8hrs? Yes    HIV Antigen Antibody Combo   Result Value Ref Range    HIV Antigen Antibody Combo Nonreactive Nonreactive      Comment:      HIV-1 p24 Ag & HIV-1/HIV-2 Ab Not Detected   Hepatitis C Screen Reflex to HCV RNA Quant and Genotype   Result Value Ref Range    Hepatitis C Antibody Nonreactive Nonreactive    Narrative    Assay performance characteristics have not been established for newborns, infants, and children.       If you have any questions or concerns, please call the clinic at the number listed above.       Sincerely,      Kaley Payne PA-C

## 2021-08-17 NOTE — PROGRESS NOTES
SUBJECTIVE:   CC: Opal Muller is an 40 year old woman who presents for preventive health visit.     Patient has been advised of split billing requirements and indicates understanding: Yes  Healthy Habits:     Getting at least 3 servings of Calcium per day:  Yes    Bi-annual eye exam:  NO    Dental care twice a year:  NO    Sleep apnea or symptoms of sleep apnea:  None    Diet:  Regular (no restrictions)    Frequency of exercise:  None    Taking medications regularly:  No    Barriers to taking medications:  Problems remembering to take them    Medication side effects:  Not applicable    PHQ-2 Total Score: 0    Additional concerns today:  Yes    Father with AAA  Smoking history - 1-2 Pack year hx - as a teen        Today's PHQ-2 Score:   PHQ-2 ( 1999 Pf[employer]) 8/17/2021   Q1: Little interest or pleasure in doing things 0   Q2: Feeling down, depressed or hopeless 0   PHQ-2 Score 0   Q1: Little interest or pleasure in doing things Not at all   Q2: Feeling down, depressed or hopeless Not at all   PHQ-2 Score 0       Abuse: Current or Past (Physical, Sexual or Emotional) - No  Do you feel safe in your environment? Yes    Have you ever done Advance Care Planning? (For example, a Health Directive, POLST, or a discussion with a medical provider or your loved ones about your wishes): No, advance care planning information given to patient to review.  Advanced care planning was discussed at today's visit.    Social History     Tobacco Use     Smoking status: Passive Smoke Exposure - Never Smoker     Smokeless tobacco: Never Used     Tobacco comment: smoked a few cigarettes occasionally as teenager for less than 1 year, quit    Substance Use Topics     Alcohol use: Yes     Comment: occasional glass of wine     If you drink alcohol do you typically have >3 drinks per day or >7 drinks per week? No    Alcohol Use 8/17/2021   Prescreen: >3 drinks/day or >7 drinks/week? No   Prescreen: >3 drinks/day or >7 drinks/week? -   No  flowsheet data found.    Reviewed orders with patient.  Reviewed health maintenance and updated orders accordingly - Yes  There is no problem list on file for this patient.    Past Surgical History:   Procedure Laterality Date     TONSILLECTOMY      childhood     TUBAL LIGATION Bilateral     after last  section       Social History     Tobacco Use     Smoking status: Passive Smoke Exposure - Never Smoker     Smokeless tobacco: Never Used     Tobacco comment: smoked a few cigarettes occasionally as teenager for less than 1 year, quit    Substance Use Topics     Alcohol use: Yes     Comment: occasional glass of wine     Family History   Problem Relation Age of Onset     Multiple Sclerosis Mother      Hypertension Father      Hyperlipidemia Father      Eye Disorder Father      Aneurysm Father         thoracic     Skin Cancer Maternal Grandmother         severe burn injuries, likely Marjolin's ulcer         Current Outpatient Medications   Medication Sig Dispense Refill     omeprazole (PRILOSEC) 20 MG DR capsule Take 1 capsule (20 mg) by mouth daily 30 capsule 1     Allergies   Allergen Reactions     Seasonal Allergies        Breast Cancer Screening:    FHS-7:   Breast CA Risk Assessment (FHS-7) 2021   Did any of your first-degree relatives have breast or ovarian cancer? No   Did any of your relatives have bilateral breast cancer? No   Did any man in your family have breast cancer? Yes   Did any woman in your family have breast and ovarian cancer? Yes   Did any woman in your family have breast cancer before age 50 y? Yes   Do you have 2 or more relatives with breast and/or ovarian cancer? Unknown   Do you have 2 or more relatives with breast and/or bowel cancer? Unknown       Mammogram Screening - Offered annual screening and updated Health Maintenance for mutual plan based on risk factor consideration    Pertinent mammograms are reviewed under the imaging tab.    History of abnormal Pap smear: NO - age  "30-65 PAP every 5 years with negative HPV co-testing recommended  PAP / HPV Latest Ref Rng & Units 4/17/2017   PAP (Historical) - NIL   HPV16 NEG Negative   HPV18 NEG Negative   HRHPV NEG Negative     Reviewed and updated as needed this visit by clinical staff  Tobacco  Allergies  Meds              Reviewed and updated as needed this visit by Provider                    Review of Systems   Constitutional: Negative.  Negative for chills and fever.   HENT: Negative.  Negative for congestion, ear pain, hearing loss and sore throat.    Eyes: Negative.  Negative for pain and visual disturbance.   Respiratory: Negative.  Negative for cough and shortness of breath.    Cardiovascular: Negative.  Negative for palpitations and peripheral edema.   Gastrointestinal: Positive for abdominal pain and heartburn. Negative for constipation, diarrhea, hematochezia and nausea.   Breasts:  Negative for tenderness, breast mass and discharge.   Genitourinary: Negative.  Negative for dysuria, frequency, genital sores, hematuria, pelvic pain, urgency, vaginal bleeding and vaginal discharge.   Musculoskeletal: Negative.    Skin: Negative.  Negative for rash.   Neurological: Negative.  Negative for dizziness, weakness, headaches and paresthesias.   Psychiatric/Behavioral: Negative.  Negative for mood changes. The patient is not nervous/anxious.           OBJECTIVE:   /78   Pulse 87   Temp 97.6  F (36.4  C) (Tympanic)   Ht 1.654 m (5' 5.12\")   Wt 98 kg (216 lb)   LMP 08/03/2021   SpO2 98%   BMI 35.81 kg/m    Physical Exam  Constitutional:       General: She is not in acute distress.     Appearance: She is well-developed.   HENT:      Right Ear: Tympanic membrane and external ear normal.      Left Ear: Tympanic membrane and external ear normal.   Eyes:      General:         Right eye: No discharge.         Left eye: No discharge.      Conjunctiva/sclera: Conjunctivae normal.      Pupils: Pupils are equal, round, and reactive to " light.   Neck:      Thyroid: No thyromegaly.      Trachea: No tracheal deviation.   Cardiovascular:      Rate and Rhythm: Normal rate and regular rhythm.      Pulses: Normal pulses.      Heart sounds: Normal heart sounds, S1 normal and S2 normal. No murmur heard.   No friction rub. No S3 or S4 sounds.    Pulmonary:      Effort: Pulmonary effort is normal. No respiratory distress.      Breath sounds: Normal breath sounds. No wheezing or rales.   Chest:      Breasts:         Right: No mass, nipple discharge or tenderness.         Left: No mass, nipple discharge or tenderness.   Abdominal:      Palpations: Abdomen is soft. There is no mass.      Tenderness: There is no abdominal tenderness.   Musculoskeletal:         General: Normal range of motion.      Cervical back: Neck supple.   Lymphadenopathy:      Cervical: No cervical adenopathy.   Skin:     General: Skin is warm and dry.      Findings: No rash.   Neurological:      Mental Status: She is alert and oriented to person, place, and time.      Motor: No abnormal muscle tone.   Psychiatric:         Thought Content: Thought content normal.         Judgment: Judgment normal.       Waist circumference - 40        Diagnostic Test Results:  Labs reviewed in Epic    ASSESSMENT/PLAN:       ICD-10-CM    1. Encounter for routine adult health examination without abnormal findings  Z00.00 Lipid panel reflex to direct LDL Fasting     Glucose   2. Screening for HIV (human immunodeficiency virus)  Z11.4 HIV Antigen Antibody Combo   3. Need for hepatitis C screening test  Z11.59 Hepatitis C Screen Reflex to HCV RNA Quant and Genotype   4. Encounter for screening mammogram for breast cancer  Z12.31 MA Screen Bilateral w/Solo   5. Family history of abdominal aortic aneurysm (AAA)  Z82.49 US Abdominal Aorta Imaging   6. Esophageal dysphagia  R13.10 Adult Gastro Ref - Consult Only     omeprazole (PRILOSEC) 20 MG DR capsule      Prev care as above  Dysphagia - concern for food getting  "stuck and chest pain - referral to GI to determine next steps - likely EGD.   Start with omeprazole for heartburn/GERD sx    Patient has been advised of split billing requirements and indicates understanding: Yes  COUNSELING:  Reviewed preventive health counseling, as reflected in patient instructions    Estimated body mass index is 35.81 kg/m  as calculated from the following:    Height as of this encounter: 1.654 m (5' 5.12\").    Weight as of this encounter: 98 kg (216 lb).    Weight management plan: Discussed healthy diet and exercise guidelines    She reports that she is a non-smoker but has been exposed to tobacco smoke. She has never used smokeless tobacco.      Counseling Resources:  ATP IV Guidelines  Pooled Cohorts Equation Calculator  Breast Cancer Risk Calculator  BRCA-Related Cancer Risk Assessment: FHS-7 Tool  FRAX Risk Assessment  ICSI Preventive Guidelines  Dietary Guidelines for Americans, 2010  USDA's MyPlate  ASA Prophylaxis  Lung CA Screening    REJI Wilde [facility]  "

## 2021-08-18 LAB
CHOLEST SERPL-MCNC: 186 MG/DL
FASTING STATUS PATIENT QL REPORTED: YES
FASTING STATUS PATIENT QL REPORTED: YES
GLUCOSE BLD-MCNC: 95 MG/DL (ref 70–99)
HCV AB SERPL QL IA: NONREACTIVE
HDLC SERPL-MCNC: 67 MG/DL
HIV 1+2 AB+HIV1 P24 AG SERPL QL IA: NONREACTIVE
LDLC SERPL CALC-MCNC: 97 MG/DL
NONHDLC SERPL-MCNC: 119 MG/DL
TRIGL SERPL-MCNC: 108 MG/DL

## 2021-08-20 ENCOUNTER — HOSPITAL ENCOUNTER (OUTPATIENT)
Dept: MAMMOGRAPHY | Facility: CLINIC | Age: 41
Discharge: HOME | End: 2021-08-20
Attending: PHYSICIAN ASSISTANT | Admitting: PHYSICIAN ASSISTANT
Payer: COMMERCIAL

## 2021-08-20 DIAGNOSIS — Z12.31 ENCOUNTER FOR SCREENING MAMMOGRAM FOR BREAST CANCER: ICD-10-CM

## 2021-08-20 PROCEDURE — 77063 BREAST TOMOSYNTHESIS BI: CPT

## 2021-08-26 ENCOUNTER — TRANSFERRED RECORDS (OUTPATIENT)
Dept: HEALTH INFORMATION MANAGEMENT | Facility: CLINIC | Age: 41
End: 2021-08-26

## 2021-08-26 DIAGNOSIS — Z11.59 ENCOUNTER FOR SCREENING FOR OTHER VIRAL DISEASES: ICD-10-CM

## 2021-08-30 ENCOUNTER — LAB (OUTPATIENT)
Dept: URGENT CARE | Facility: URGENT CARE | Age: 41
End: 2021-08-30
Attending: INTERNAL MEDICINE
Payer: COMMERCIAL

## 2021-08-30 ENCOUNTER — HOSPITAL ENCOUNTER (OUTPATIENT)
Dept: ULTRASOUND IMAGING | Facility: CLINIC | Age: 41
Discharge: HOME | End: 2021-08-30
Attending: PHYSICIAN ASSISTANT | Admitting: PHYSICIAN ASSISTANT
Payer: COMMERCIAL

## 2021-08-30 DIAGNOSIS — Z82.49 FAMILY HISTORY OF ABDOMINAL AORTIC ANEURYSM (AAA): ICD-10-CM

## 2021-08-30 DIAGNOSIS — Z11.59 ENCOUNTER FOR SCREENING FOR OTHER VIRAL DISEASES: ICD-10-CM

## 2021-08-30 PROCEDURE — U0003 INFECTIOUS AGENT DETECTION BY NUCLEIC ACID (DNA OR RNA); SEVERE ACUTE RESPIRATORY SYNDROME CORONAVIRUS 2 (SARS-COV-2) (CORONAVIRUS DISEASE [COVID-19]), AMPLIFIED PROBE TECHNIQUE, MAKING USE OF HIGH THROUGHPUT TECHNOLOGIES AS DESCRIBED BY CMS-2020-01-R: HCPCS

## 2021-08-30 PROCEDURE — U0005 INFEC AGEN DETEC AMPLI PROBE: HCPCS

## 2021-08-30 PROCEDURE — 76775 US EXAM ABDO BACK WALL LIM: CPT

## 2021-08-31 LAB — SARS-COV-2 RNA RESP QL NAA+PROBE: NEGATIVE

## 2021-09-03 ENCOUNTER — HOSPITAL ENCOUNTER (OUTPATIENT)
Facility: CLINIC | Age: 41
Discharge: HOME | End: 2021-09-03
Attending: INTERNAL MEDICINE | Admitting: INTERNAL MEDICINE
Payer: COMMERCIAL

## 2021-09-03 VITALS
BODY MASS INDEX: 35.99 KG/M2 | RESPIRATION RATE: 13 BRPM | DIASTOLIC BLOOD PRESSURE: 88 MMHG | SYSTOLIC BLOOD PRESSURE: 116 MMHG | OXYGEN SATURATION: 97 % | TEMPERATURE: 97.3 F | WEIGHT: 216 LBS | HEART RATE: 65 BPM | HEIGHT: 65 IN

## 2021-09-03 LAB — UPPER GI ENDOSCOPY: NORMAL

## 2021-09-03 PROCEDURE — 43239 EGD BIOPSY SINGLE/MULTIPLE: CPT | Performed by: INTERNAL MEDICINE

## 2021-09-03 PROCEDURE — G0500 MOD SEDAT ENDO SERVICE >5YRS: HCPCS | Performed by: INTERNAL MEDICINE

## 2021-09-03 PROCEDURE — 88305 TISSUE EXAM BY PATHOLOGIST: CPT | Mod: TC | Performed by: INTERNAL MEDICINE

## 2021-09-03 PROCEDURE — 250N000011 HC RX IP 250 OP 636: Performed by: INTERNAL MEDICINE

## 2021-09-03 PROCEDURE — 999N000099 HC STATISTIC MODERATE SEDATION < 10 MIN: Performed by: INTERNAL MEDICINE

## 2021-09-03 RX ORDER — FENTANYL CITRATE 50 UG/ML
50-100 INJECTION, SOLUTION INTRAMUSCULAR; INTRAVENOUS
Status: DISCONTINUED | OUTPATIENT
Start: 2021-09-03 | End: 2021-09-03 | Stop reason: HOSPADM

## 2021-09-03 RX ORDER — FLUMAZENIL 0.1 MG/ML
0.2 INJECTION, SOLUTION INTRAVENOUS
Status: DISCONTINUED | OUTPATIENT
Start: 2021-09-03 | End: 2021-09-03 | Stop reason: HOSPADM

## 2021-09-03 RX ORDER — NALOXONE HYDROCHLORIDE 0.4 MG/ML
0.4 INJECTION, SOLUTION INTRAMUSCULAR; INTRAVENOUS; SUBCUTANEOUS
Status: DISCONTINUED | OUTPATIENT
Start: 2021-09-03 | End: 2021-09-03 | Stop reason: HOSPADM

## 2021-09-03 RX ORDER — ONDANSETRON 2 MG/ML
4 INJECTION INTRAMUSCULAR; INTRAVENOUS EVERY 6 HOURS PRN
Status: DISCONTINUED | OUTPATIENT
Start: 2021-09-03 | End: 2021-09-03 | Stop reason: HOSPADM

## 2021-09-03 RX ORDER — ONDANSETRON 2 MG/ML
4 INJECTION INTRAMUSCULAR; INTRAVENOUS
Status: DISCONTINUED | OUTPATIENT
Start: 2021-09-03 | End: 2021-09-03 | Stop reason: HOSPADM

## 2021-09-03 RX ORDER — EPINEPHRINE 1 MG/ML
0.1 INJECTION, SOLUTION, CONCENTRATE INTRAVENOUS
Status: DISCONTINUED | OUTPATIENT
Start: 2021-09-03 | End: 2021-09-03 | Stop reason: HOSPADM

## 2021-09-03 RX ORDER — SIMETHICONE 40MG/0.6ML
133 SUSPENSION, DROPS(FINAL DOSAGE FORM)(ML) ORAL
Status: DISCONTINUED | OUTPATIENT
Start: 2021-09-03 | End: 2021-09-03 | Stop reason: HOSPADM

## 2021-09-03 RX ORDER — ONDANSETRON 4 MG/1
4 TABLET, ORALLY DISINTEGRATING ORAL EVERY 6 HOURS PRN
Status: DISCONTINUED | OUTPATIENT
Start: 2021-09-03 | End: 2021-09-03 | Stop reason: HOSPADM

## 2021-09-03 RX ORDER — FENTANYL CITRATE 50 UG/ML
INJECTION, SOLUTION INTRAMUSCULAR; INTRAVENOUS PRN
Status: COMPLETED | OUTPATIENT
Start: 2021-09-03 | End: 2021-09-03

## 2021-09-03 RX ORDER — ATROPINE SULFATE 0.4 MG/ML
0.4 AMPUL (ML) INJECTION
Status: DISCONTINUED | OUTPATIENT
Start: 2021-09-03 | End: 2021-09-03 | Stop reason: HOSPADM

## 2021-09-03 RX ORDER — NALOXONE HYDROCHLORIDE 0.4 MG/ML
0.2 INJECTION, SOLUTION INTRAMUSCULAR; INTRAVENOUS; SUBCUTANEOUS
Status: DISCONTINUED | OUTPATIENT
Start: 2021-09-03 | End: 2021-09-03 | Stop reason: HOSPADM

## 2021-09-03 RX ORDER — LIDOCAINE 40 MG/G
CREAM TOPICAL
Status: DISCONTINUED | OUTPATIENT
Start: 2021-09-03 | End: 2021-09-03 | Stop reason: HOSPADM

## 2021-09-03 RX ORDER — FENTANYL CITRATE 50 UG/ML
25-50 INJECTION, SOLUTION INTRAMUSCULAR; INTRAVENOUS
Status: DISCONTINUED | OUTPATIENT
Start: 2021-09-03 | End: 2021-09-03 | Stop reason: HOSPADM

## 2021-09-03 RX ORDER — PROCHLORPERAZINE MALEATE 10 MG
10 TABLET ORAL EVERY 6 HOURS PRN
Status: DISCONTINUED | OUTPATIENT
Start: 2021-09-03 | End: 2021-09-03 | Stop reason: HOSPADM

## 2021-09-03 RX ADMIN — FENTANYL CITRATE 100 MCG: 50 INJECTION, SOLUTION INTRAMUSCULAR; INTRAVENOUS at 12:03

## 2021-09-03 RX ADMIN — MIDAZOLAM 2 MG: 1 INJECTION INTRAMUSCULAR; INTRAVENOUS at 12:03

## 2021-09-03 RX ADMIN — MIDAZOLAM 2 MG: 1 INJECTION INTRAMUSCULAR; INTRAVENOUS at 12:05

## 2021-09-03 ASSESSMENT — MIFFLIN-ST. JEOR: SCORE: 1640.65

## 2021-09-05 ENCOUNTER — HEALTH MAINTENANCE LETTER (OUTPATIENT)
Age: 41
End: 2021-09-05

## 2021-09-07 LAB
PATH REPORT.COMMENTS IMP SPEC: NORMAL
PATH REPORT.COMMENTS IMP SPEC: NORMAL
PATH REPORT.FINAL DX SPEC: NORMAL
PATH REPORT.GROSS SPEC: NORMAL
PATH REPORT.MICROSCOPIC SPEC OTHER STN: NORMAL
PATH REPORT.RELEVANT HX SPEC: NORMAL
PHOTO IMAGE: NORMAL

## 2021-09-07 PROCEDURE — 88305 TISSUE EXAM BY PATHOLOGIST: CPT | Mod: 26 | Performed by: PATHOLOGY

## 2021-09-30 DIAGNOSIS — R13.19 ESOPHAGEAL DYSPHAGIA: ICD-10-CM

## 2021-10-04 ENCOUNTER — TRANSFERRED RECORDS (OUTPATIENT)
Dept: HEALTH INFORMATION MANAGEMENT | Facility: CLINIC | Age: 41
End: 2021-10-04

## 2022-06-06 ENCOUNTER — TRANSFERRED RECORDS (OUTPATIENT)
Dept: HEALTH INFORMATION MANAGEMENT | Facility: CLINIC | Age: 42
End: 2022-06-06
Payer: COMMERCIAL

## 2022-06-22 ENCOUNTER — TRANSFERRED RECORDS (OUTPATIENT)
Dept: FAMILY MEDICINE | Facility: CLINIC | Age: 42
End: 2022-06-22

## 2022-08-02 ENCOUNTER — OFFICE VISIT (OUTPATIENT)
Dept: FAMILY MEDICINE | Facility: CLINIC | Age: 42
End: 2022-08-02
Payer: COMMERCIAL

## 2022-08-02 VITALS
RESPIRATION RATE: 16 BRPM | OXYGEN SATURATION: 99 % | HEART RATE: 77 BPM | DIASTOLIC BLOOD PRESSURE: 86 MMHG | WEIGHT: 180.2 LBS | SYSTOLIC BLOOD PRESSURE: 120 MMHG | TEMPERATURE: 98.3 F | BODY MASS INDEX: 29.99 KG/M2

## 2022-08-02 DIAGNOSIS — Z01.818 PREOP GENERAL PHYSICAL EXAM: Primary | ICD-10-CM

## 2022-08-02 DIAGNOSIS — K44.9 HIATAL HERNIA: ICD-10-CM

## 2022-08-02 DIAGNOSIS — K22.70 BARRETT'S ESOPHAGUS DETERMINED BY BIOPSY: ICD-10-CM

## 2022-08-02 DIAGNOSIS — R13.19 ESOPHAGEAL DYSPHAGIA: ICD-10-CM

## 2022-08-02 LAB — HGB BLD-MCNC: 10.5 G/DL (ref 11.7–15.7)

## 2022-08-02 PROCEDURE — 85018 HEMOGLOBIN: CPT | Performed by: PHYSICIAN ASSISTANT

## 2022-08-02 PROCEDURE — 36415 COLL VENOUS BLD VENIPUNCTURE: CPT | Performed by: PHYSICIAN ASSISTANT

## 2022-08-02 PROCEDURE — 99214 OFFICE O/P EST MOD 30 MIN: CPT | Performed by: PHYSICIAN ASSISTANT

## 2022-08-02 ASSESSMENT — PAIN SCALES - GENERAL: PAINLEVEL: NO PAIN (0)

## 2022-08-02 NOTE — PROGRESS NOTES
[address] [address]  Phone: [phone]  Primary Provider: Richar Powell  Pre-op Performing Provider: NABEEL ALONSO      PREOPERATIVE EVALUATION:  Today's date: 8/2/2022    Opal Muller is a 41 year old female who presents for a preoperative evaluation.    Surgical Information:  Surgery/Procedure: ROBOT ASSISTED LAPAROSCOPIC HELLER MYOTOMY WITH PARAESOPHAGEAL HIATAL HERNIA REPAIR AND PARTIAL FUNDOPLICATION  Surgery Location: [facility]  Surgeon: Francis Ferguson MD  Surgery Date: 08/11/2022  Time of Surgery: 12:00PM  Where patient plans to recover: At home with family  Fax number for surgical facility: Note does not need to be faxed, will be available electronically in Epic.    Type of Anesthesia Anticipated: to be determined    Assessment & Plan     The proposed surgical procedure is considered INTERMEDIATE risk.    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              14 minutes spent on the date of the encounter doing chart review, history and exam, documentation and further activities per the note    {Provider  Link to [facility] Help Grid :[id number]}    Subjective     HPI related to upcoming procedure: hiatal hernia repair    Preop Questions 7/31/2022   1. Have you ever had a heart attack or stroke? No   2. Have you ever had surgery on your heart or blood vessels, such as a stent placement, a coronary artery bypass, or surgery on an artery in your head, neck, heart, or legs? No   3.  Do you have chest pain with activity? No   4. Do you have a history of  heart failure? No   5. Do you currently have a cold, bronchitis or symptoms of other infection? No   6. Do you have a cough, shortness of breath, or wheezing? No   7. Do you or anyone in your family have previous history of blood clots? YES - uncle   8. Do you or does anyone in your family have a serious bleeding problem such as prolonged bleeding following surgeries or cuts? YES - daughter had ITP   9. Have you ever had problems with anemia or been told to take iron pills? YES - pregnancy and hemorrhage after tonsillectomy     10. Have you had any abnormal blood loss such as black, tarry or bloody stools, or abnormal vaginal bleeding? No   11. Have you ever had a blood transfusion? No   12. Are you willing to have a blood transfusion if it is medically needed before, during, or after your surgery? Yes   13. Have you or any of your relatives ever had problems with anesthesia? UNKNOWN - patient has tolerated anesthesia in the past   14. Do you have sleep apnea, excessive snoring or daytime drowsiness? No   15. Do you have any artifical heart valves or other implanted medical devices like a pacemaker, defibrillator, or continuous glucose monitor? No   16. Do you have artificial joints? No   17. Are you allergic to latex? No   18. Is there any chance that you may be pregnant? No       Health Care Directive:  Patient does not have a Health Care Directive or Living Will: Discussed advance care planning with patient; information given to patient to review.    Preoperative Review of :   reviewed - no record of controlled substances prescribed.          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      Past Surgical History:   Procedure Laterality Date      C/SECTION, LOW TRANSVERSE           ESOPHAGOSCOPY, GASTROSCOPY, DUODENOSCOPY (EGD), COMBINED N/A 9/3/2021    Procedure: ESOPHAGOGASTRODUODENOSCOPY, WITH BIOPSY;  Surgeon: Mary Rodriguez MD;  Location:  GI     TONSILLECTOMY      childhood     TUBAL LIGATION Bilateral     after last  section     Current Outpatient Medications   Medication Sig Dispense Refill     omeprazole (PRILOSEC) 20 MG DR capsule TAKE 1 CAPSULE BY MOUTH EVERY DAY (Patient not taking: Reported on 2022) 30 capsule 7       Allergies   Allergen Reactions     Seasonal Allergies         Social History     Tobacco Use     Smoking status: Never Smoker     Smokeless tobacco: Never Used   Substance Use Topics     Alcohol use: Yes     Comment: occasional glass of wine       History   Drug Use No         Objective     /86 (BP Location: Left arm, Patient Position: Sitting, Cuff Size: Adult Regular)   Pulse 77   Temp 98.3  F (36.8  C) (Tympanic)   Resp 16   Wt 81.7 kg (180 lb 3.2 oz)   LMP 2022   SpO2 99%   Breastfeeding No   BMI 29.99 kg/m      Physical Exam  Constitutional:       General: She is not in acute distress.     Appearance: She is well-developed. She is not diaphoretic.   HENT:      Head: Normocephalic.      Right Ear: External ear normal.      Left Ear: External ear normal.      Nose: Nose normal.   Eyes:      Conjunctiva/sclera: Conjunctivae normal.   Cardiovascular:      Rate and Rhythm: Normal rate and regular rhythm.      Heart sounds: Normal heart sounds.   Pulmonary:      Effort: Pulmonary effort is normal.      Breath sounds: Normal breath sounds.   Musculoskeletal:      Cervical back: Normal range of motion.   Skin:     General: Skin is warm and dry.   Neurological:      Mental Status: She is alert and oriented to person, place, and time.   Psychiatric:         Judgment: Judgment normal.           No results for input(s): HGB, PLT, INR, NA, POTASSIUM, CR, A1C in the last 03845 hours.      Diagnostics:  Labs pending at this time.  Results will be reviewed when available.   No EKG required, no history of coronary heart disease, significant arrhythmia, peripheral arterial disease or other structural heart disease.    Revised Cardiac Risk Index (RCRI):  The patient has the following serious cardiovascular risks for perioperative complications:   - No serious cardiac risks = 0 points     RCRI Interpretation: 0 points: Class I (very low risk - 0.4% complication rate)           Signed Electronically by: Kaley Payne PA-C  Copy of this evaluation report is provided to requesting physician.

## 2022-08-04 NOTE — RESULT ENCOUNTER NOTE
Sushila    Your lab tests are complete and I have reviewed the results.     - Your hemoglobin is slightly low, however this is ok for surgery as planned.      If you have any questions or concerns, please feel free to call or send a [facility] message.    Sincerely,  Bhavin Payne PA-C

## 2022-08-08 ENCOUNTER — LAB (OUTPATIENT)
Dept: LAB | Facility: CLINIC | Age: 42
End: 2022-08-08
Attending: FAMILY MEDICINE
Payer: COMMERCIAL

## 2022-08-08 DIAGNOSIS — Z01.818 PRE-OPERATIVE GENERAL PHYSICAL EXAMINATION: ICD-10-CM

## 2022-08-08 PROCEDURE — U0005 INFEC AGEN DETEC AMPLI PROBE: HCPCS

## 2022-08-08 PROCEDURE — U0003 INFECTIOUS AGENT DETECTION BY NUCLEIC ACID (DNA OR RNA); SEVERE ACUTE RESPIRATORY SYNDROME CORONAVIRUS 2 (SARS-COV-2) (CORONAVIRUS DISEASE [COVID-19]), AMPLIFIED PROBE TECHNIQUE, MAKING USE OF HIGH THROUGHPUT TECHNOLOGIES AS DESCRIBED BY CMS-2020-01-R: HCPCS

## 2022-08-09 LAB — SARS-COV-2 RNA RESP QL NAA+PROBE: NEGATIVE

## 2022-08-10 ENCOUNTER — ANESTHESIA EVENT (OUTPATIENT)
Dept: SURGERY | Facility: CLINIC | Age: 42
End: 2022-08-10
Payer: COMMERCIAL

## 2022-08-10 RX ORDER — PEDI MULTIVIT NO.25/FOLIC ACID 300 MCG
1 TABLET,CHEWABLE ORAL EVERY EVENING
COMMUNITY
End: 2024-05-29

## 2022-08-10 NOTE — PROGRESS NOTES
PTA medications updated by Medication Scribe prior to surgery via phone call with patient (last doses completed by Nurse)     Medication history sources: Patient, Surescripts and H&P  In the past week, patient estimated taking medication this percent of the time: Greater than 90%  Adherence assessment: N/A Not Observed    Significant changes made to the medication list:  None and Patient reports no longer taking the following meds (med scribe removed from PTA med list): Omeprazole      Additional medication history information:   None    Medication reconciliation completed by provider prior to medication history? No    Time spent in this activity: 20 minutes    The information provided in this note is only as accurate as the sources available at the time of update(s)      Prior to Admission medications    Medication Sig Last Dose Taking? Auth Provider Long Term End Date   childrens multivitamin w/iron (FLINTSTONES COMPLETE) 18 MG chewable tablet Take 1 chew tab by mouth every evening  at PM Yes Reported, Patient

## 2022-08-11 ENCOUNTER — ANESTHESIA (OUTPATIENT)
Dept: SURGERY | Facility: CLINIC | Age: 42
End: 2022-08-11
Payer: COMMERCIAL

## 2022-08-11 ENCOUNTER — HOSPITAL ENCOUNTER (OUTPATIENT)
Facility: CLINIC | Age: 42
Discharge: HOME | End: 2022-08-12
Attending: SURGERY | Admitting: SURGERY
Payer: COMMERCIAL

## 2022-08-11 DIAGNOSIS — K22.70 BARRETT'S ESOPHAGUS DETERMINED BY BIOPSY: Primary | ICD-10-CM

## 2022-08-11 DIAGNOSIS — K22.0 ACHALASIA: ICD-10-CM

## 2022-08-11 DIAGNOSIS — K44.9 PARAESOPHAGEAL HERNIA: ICD-10-CM

## 2022-08-11 LAB — PLATELET # BLD AUTO: 229 10E3/UL (ref 150–450)

## 2022-08-11 PROCEDURE — 250N000009 HC RX 250: Performed by: SURGERY

## 2022-08-11 PROCEDURE — 370N000017 HC ANESTHESIA TECHNICAL FEE, PER MIN: Performed by: SURGERY

## 2022-08-11 PROCEDURE — 250N000009 HC RX 250: Performed by: NURSE ANESTHETIST, CERTIFIED REGISTERED

## 2022-08-11 PROCEDURE — 710N000009 HC RECOVERY PHASE 1, LEVEL 1, PER MIN: Performed by: SURGERY

## 2022-08-11 PROCEDURE — 250N000013 HC RX MED GY IP 250 OP 250 PS 637: Performed by: SURGERY

## 2022-08-11 PROCEDURE — 85049 AUTOMATED PLATELET COUNT: CPT | Performed by: SURGERY

## 2022-08-11 PROCEDURE — 250N000025 HC SEVOFLURANE, PER MIN: Performed by: SURGERY

## 2022-08-11 PROCEDURE — 360N000080 HC SURGERY LEVEL 7, PER MIN: Performed by: SURGERY

## 2022-08-11 PROCEDURE — 36415 COLL VENOUS BLD VENIPUNCTURE: CPT | Performed by: SURGERY

## 2022-08-11 PROCEDURE — 250N000011 HC RX IP 250 OP 636: Performed by: SURGERY

## 2022-08-11 PROCEDURE — 258N000003 HC RX IP 258 OP 636: Performed by: NURSE ANESTHETIST, CERTIFIED REGISTERED

## 2022-08-11 PROCEDURE — 258N000003 HC RX IP 258 OP 636: Performed by: SURGERY

## 2022-08-11 PROCEDURE — 272N000001 HC OR GENERAL SUPPLY STERILE: Performed by: SURGERY

## 2022-08-11 PROCEDURE — 258N000001 HC RX 258: Performed by: SURGERY

## 2022-08-11 PROCEDURE — 250N000011 HC RX IP 250 OP 636: Performed by: NURSE ANESTHETIST, CERTIFIED REGISTERED

## 2022-08-11 PROCEDURE — 999N000141 HC STATISTIC PRE-PROCEDURE NURSING ASSESSMENT: Performed by: SURGERY

## 2022-08-11 RX ORDER — PROCHLORPERAZINE MALEATE 10 MG
10 TABLET ORAL EVERY 6 HOURS PRN
Status: DISCONTINUED | OUTPATIENT
Start: 2022-08-11 | End: 2022-08-12 | Stop reason: HOSPADM

## 2022-08-11 RX ORDER — KETOROLAC TROMETHAMINE 15 MG/ML
15 INJECTION, SOLUTION INTRAMUSCULAR; INTRAVENOUS EVERY 6 HOURS
Status: COMPLETED | OUTPATIENT
Start: 2022-08-11 | End: 2022-08-12

## 2022-08-11 RX ORDER — EPINEPHRINE 1 MG/ML(1)
AMPUL (ML) INJECTION PRN
Status: DISCONTINUED | OUTPATIENT
Start: 2022-08-11 | End: 2022-08-11 | Stop reason: HOSPADM

## 2022-08-11 RX ORDER — SODIUM CHLORIDE, SODIUM LACTATE, POTASSIUM CHLORIDE, CALCIUM CHLORIDE 600; 310; 30; 20 MG/100ML; MG/100ML; MG/100ML; MG/100ML
INJECTION, SOLUTION INTRAVENOUS CONTINUOUS
Status: DISCONTINUED | OUTPATIENT
Start: 2022-08-11 | End: 2022-08-12 | Stop reason: CLARIF

## 2022-08-11 RX ORDER — ONDANSETRON 2 MG/ML
4 INJECTION INTRAMUSCULAR; INTRAVENOUS EVERY 30 MIN PRN
Status: DISCONTINUED | OUTPATIENT
Start: 2022-08-11 | End: 2022-08-11 | Stop reason: HOSPADM

## 2022-08-11 RX ORDER — PANTOPRAZOLE SODIUM 40 MG/1
40 TABLET, DELAYED RELEASE ORAL
Status: DISCONTINUED | OUTPATIENT
Start: 2022-08-12 | End: 2022-08-12 | Stop reason: HOSPADM

## 2022-08-11 RX ORDER — PROPOFOL 10 MG/ML
INJECTION, EMULSION INTRAVENOUS PRN
Status: DISCONTINUED | OUTPATIENT
Start: 2022-08-11 | End: 2022-08-11

## 2022-08-11 RX ORDER — ONDANSETRON 2 MG/ML
INJECTION INTRAMUSCULAR; INTRAVENOUS PRN
Status: DISCONTINUED | OUTPATIENT
Start: 2022-08-11 | End: 2022-08-11

## 2022-08-11 RX ORDER — MAGNESIUM HYDROXIDE 1200 MG/15ML
LIQUID ORAL PRN
Status: DISCONTINUED | OUTPATIENT
Start: 2022-08-11 | End: 2022-08-11 | Stop reason: HOSPADM

## 2022-08-11 RX ORDER — NALOXONE HYDROCHLORIDE 0.4 MG/ML
0.4 INJECTION, SOLUTION INTRAMUSCULAR; INTRAVENOUS; SUBCUTANEOUS
Status: DISCONTINUED | OUTPATIENT
Start: 2022-08-11 | End: 2022-08-12 | Stop reason: HOSPADM

## 2022-08-11 RX ORDER — POLYETHYLENE GLYCOL 3350 17 G/17G
17 POWDER, FOR SOLUTION ORAL DAILY
Status: DISCONTINUED | OUTPATIENT
Start: 2022-08-12 | End: 2022-08-12 | Stop reason: HOSPADM

## 2022-08-11 RX ORDER — OXYCODONE HYDROCHLORIDE 5 MG/1
5 TABLET ORAL EVERY 4 HOURS PRN
Status: DISCONTINUED | OUTPATIENT
Start: 2022-08-11 | End: 2022-08-12 | Stop reason: HOSPADM

## 2022-08-11 RX ORDER — NALOXONE HYDROCHLORIDE 0.4 MG/ML
0.2 INJECTION, SOLUTION INTRAMUSCULAR; INTRAVENOUS; SUBCUTANEOUS
Status: DISCONTINUED | OUTPATIENT
Start: 2022-08-11 | End: 2022-08-12 | Stop reason: HOSPADM

## 2022-08-11 RX ORDER — NEOSTIGMINE METHYLSULFATE 1 MG/ML
VIAL (ML) INJECTION PRN
Status: DISCONTINUED | OUTPATIENT
Start: 2022-08-11 | End: 2022-08-11

## 2022-08-11 RX ORDER — HYDROXYZINE HYDROCHLORIDE 25 MG/1
25 TABLET, FILM COATED ORAL EVERY 6 HOURS PRN
Status: DISCONTINUED | OUTPATIENT
Start: 2022-08-11 | End: 2022-08-12 | Stop reason: HOSPADM

## 2022-08-11 RX ORDER — LIDOCAINE HYDROCHLORIDE 20 MG/ML
INJECTION, SOLUTION INFILTRATION; PERINEURAL PRN
Status: DISCONTINUED | OUTPATIENT
Start: 2022-08-11 | End: 2022-08-11

## 2022-08-11 RX ORDER — SODIUM CHLORIDE, SODIUM LACTATE, POTASSIUM CHLORIDE, CALCIUM CHLORIDE 600; 310; 30; 20 MG/100ML; MG/100ML; MG/100ML; MG/100ML
INJECTION, SOLUTION INTRAVENOUS CONTINUOUS
Status: DISCONTINUED | OUTPATIENT
Start: 2022-08-11 | End: 2022-08-11 | Stop reason: HOSPADM

## 2022-08-11 RX ORDER — ACETAMINOPHEN 325 MG/1
975 TABLET ORAL EVERY 8 HOURS
Status: DISCONTINUED | OUTPATIENT
Start: 2022-08-11 | End: 2022-08-12 | Stop reason: HOSPADM

## 2022-08-11 RX ORDER — BISACODYL 10 MG
10 SUPPOSITORY, RECTAL RECTAL DAILY PRN
Status: DISCONTINUED | OUTPATIENT
Start: 2022-08-11 | End: 2022-08-12 | Stop reason: HOSPADM

## 2022-08-11 RX ORDER — AMOXICILLIN 250 MG
1 CAPSULE ORAL 2 TIMES DAILY
Status: DISCONTINUED | OUTPATIENT
Start: 2022-08-11 | End: 2022-08-12 | Stop reason: HOSPADM

## 2022-08-11 RX ORDER — ACETAMINOPHEN 325 MG/1
650 TABLET ORAL EVERY 4 HOURS PRN
Status: DISCONTINUED | OUTPATIENT
Start: 2022-08-14 | End: 2022-08-12 | Stop reason: HOSPADM

## 2022-08-11 RX ORDER — LIDOCAINE 40 MG/G
CREAM TOPICAL
Status: DISCONTINUED | OUTPATIENT
Start: 2022-08-11 | End: 2022-08-12 | Stop reason: HOSPADM

## 2022-08-11 RX ORDER — CEFAZOLIN SODIUM/WATER 2 G/20 ML
2 SYRINGE (ML) INTRAVENOUS
Status: COMPLETED | OUTPATIENT
Start: 2022-08-11 | End: 2022-08-11

## 2022-08-11 RX ORDER — OXYCODONE HYDROCHLORIDE 5 MG/1
10 TABLET ORAL EVERY 4 HOURS PRN
Status: DISCONTINUED | OUTPATIENT
Start: 2022-08-11 | End: 2022-08-12 | Stop reason: HOSPADM

## 2022-08-11 RX ORDER — FENTANYL CITRATE 50 UG/ML
25 INJECTION, SOLUTION INTRAMUSCULAR; INTRAVENOUS EVERY 5 MIN PRN
Status: DISCONTINUED | OUTPATIENT
Start: 2022-08-11 | End: 2022-08-11 | Stop reason: HOSPADM

## 2022-08-11 RX ORDER — ONDANSETRON 4 MG/1
4 TABLET, ORALLY DISINTEGRATING ORAL EVERY 30 MIN PRN
Status: DISCONTINUED | OUTPATIENT
Start: 2022-08-11 | End: 2022-08-11 | Stop reason: HOSPADM

## 2022-08-11 RX ORDER — ONDANSETRON 4 MG/1
4 TABLET, ORALLY DISINTEGRATING ORAL EVERY 6 HOURS PRN
Status: DISCONTINUED | OUTPATIENT
Start: 2022-08-11 | End: 2022-08-12 | Stop reason: HOSPADM

## 2022-08-11 RX ORDER — HYDROMORPHONE HCL IN WATER/PF 6 MG/30 ML
0.2 PATIENT CONTROLLED ANALGESIA SYRINGE INTRAVENOUS
Status: DISCONTINUED | OUTPATIENT
Start: 2022-08-11 | End: 2022-08-12 | Stop reason: HOSPADM

## 2022-08-11 RX ORDER — HYDROMORPHONE HCL IN WATER/PF 6 MG/30 ML
0.4 PATIENT CONTROLLED ANALGESIA SYRINGE INTRAVENOUS
Status: DISCONTINUED | OUTPATIENT
Start: 2022-08-11 | End: 2022-08-12 | Stop reason: HOSPADM

## 2022-08-11 RX ORDER — PROPOFOL 10 MG/ML
INJECTION, EMULSION INTRAVENOUS CONTINUOUS PRN
Status: DISCONTINUED | OUTPATIENT
Start: 2022-08-11 | End: 2022-08-11

## 2022-08-11 RX ORDER — DEXAMETHASONE SODIUM PHOSPHATE 4 MG/ML
INJECTION, SOLUTION INTRA-ARTICULAR; INTRALESIONAL; INTRAMUSCULAR; INTRAVENOUS; SOFT TISSUE PRN
Status: DISCONTINUED | OUTPATIENT
Start: 2022-08-11 | End: 2022-08-11

## 2022-08-11 RX ORDER — OXYCODONE HYDROCHLORIDE 5 MG/1
5 TABLET ORAL EVERY 4 HOURS PRN
Status: DISCONTINUED | OUTPATIENT
Start: 2022-08-11 | End: 2022-08-11 | Stop reason: HOSPADM

## 2022-08-11 RX ORDER — METHOCARBAMOL 750 MG/1
750 TABLET, FILM COATED ORAL EVERY 6 HOURS PRN
Status: DISCONTINUED | OUTPATIENT
Start: 2022-08-11 | End: 2022-08-12 | Stop reason: HOSPADM

## 2022-08-11 RX ORDER — SODIUM CHLORIDE, SODIUM LACTATE, POTASSIUM CHLORIDE, CALCIUM CHLORIDE 600; 310; 30; 20 MG/100ML; MG/100ML; MG/100ML; MG/100ML
INJECTION, SOLUTION INTRAVENOUS CONTINUOUS PRN
Status: DISCONTINUED | OUTPATIENT
Start: 2022-08-11 | End: 2022-08-11

## 2022-08-11 RX ORDER — HYDROMORPHONE HCL IN WATER/PF 6 MG/30 ML
0.2 PATIENT CONTROLLED ANALGESIA SYRINGE INTRAVENOUS EVERY 5 MIN PRN
Status: DISCONTINUED | OUTPATIENT
Start: 2022-08-11 | End: 2022-08-11 | Stop reason: HOSPADM

## 2022-08-11 RX ORDER — GLYCOPYRROLATE 0.2 MG/ML
INJECTION, SOLUTION INTRAMUSCULAR; INTRAVENOUS PRN
Status: DISCONTINUED | OUTPATIENT
Start: 2022-08-11 | End: 2022-08-11

## 2022-08-11 RX ORDER — HEPARIN SODIUM 5000 [USP'U]/.5ML
5000 INJECTION, SOLUTION INTRAVENOUS; SUBCUTANEOUS EVERY 12 HOURS
Status: DISCONTINUED | OUTPATIENT
Start: 2022-08-12 | End: 2022-08-12 | Stop reason: HOSPADM

## 2022-08-11 RX ORDER — BUPIVACAINE HYDROCHLORIDE AND EPINEPHRINE 5; 5 MG/ML; UG/ML
INJECTION, SOLUTION EPIDURAL; INTRACAUDAL; PERINEURAL PRN
Status: DISCONTINUED | OUTPATIENT
Start: 2022-08-11 | End: 2022-08-11 | Stop reason: HOSPADM

## 2022-08-11 RX ORDER — FENTANYL CITRATE 50 UG/ML
INJECTION, SOLUTION INTRAMUSCULAR; INTRAVENOUS PRN
Status: DISCONTINUED | OUTPATIENT
Start: 2022-08-11 | End: 2022-08-11

## 2022-08-11 RX ORDER — HEPARIN SODIUM 5000 [USP'U]/.5ML
5000 INJECTION, SOLUTION INTRAVENOUS; SUBCUTANEOUS
Status: COMPLETED | OUTPATIENT
Start: 2022-08-11 | End: 2022-08-11

## 2022-08-11 RX ORDER — CEFAZOLIN SODIUM/WATER 2 G/20 ML
2 SYRINGE (ML) INTRAVENOUS SEE ADMIN INSTRUCTIONS
Status: DISCONTINUED | OUTPATIENT
Start: 2022-08-11 | End: 2022-08-11 | Stop reason: HOSPADM

## 2022-08-11 RX ORDER — ONDANSETRON 2 MG/ML
4 INJECTION INTRAMUSCULAR; INTRAVENOUS EVERY 6 HOURS PRN
Status: DISCONTINUED | OUTPATIENT
Start: 2022-08-11 | End: 2022-08-12 | Stop reason: HOSPADM

## 2022-08-11 RX ADMIN — ROCURONIUM BROMIDE 5 MG: 50 INJECTION, SOLUTION INTRAVENOUS at 13:49

## 2022-08-11 RX ADMIN — SODIUM CHLORIDE, POTASSIUM CHLORIDE, SODIUM LACTATE AND CALCIUM CHLORIDE: 600; 310; 30; 20 INJECTION, SOLUTION INTRAVENOUS at 12:28

## 2022-08-11 RX ADMIN — PROPOFOL 50 MCG/KG/MIN: 10 INJECTION, EMULSION INTRAVENOUS at 11:15

## 2022-08-11 RX ADMIN — LIDOCAINE HYDROCHLORIDE 100 MG: 20 INJECTION, SOLUTION INFILTRATION; PERINEURAL at 10:51

## 2022-08-11 RX ADMIN — GLYCOPYRROLATE 0.6 MG: 0.2 INJECTION, SOLUTION INTRAMUSCULAR; INTRAVENOUS at 14:34

## 2022-08-11 RX ADMIN — HYDROMORPHONE HYDROCHLORIDE 0.5 MG: 1 INJECTION, SOLUTION INTRAMUSCULAR; INTRAVENOUS; SUBCUTANEOUS at 13:18

## 2022-08-11 RX ADMIN — OXYCODONE HYDROCHLORIDE 10 MG: 5 TABLET ORAL at 16:58

## 2022-08-11 RX ADMIN — KETOROLAC TROMETHAMINE 15 MG: 15 INJECTION, SOLUTION INTRAMUSCULAR; INTRAVENOUS at 16:58

## 2022-08-11 RX ADMIN — MIDAZOLAM 2 MG: 1 INJECTION INTRAMUSCULAR; INTRAVENOUS at 10:48

## 2022-08-11 RX ADMIN — SODIUM CHLORIDE, POTASSIUM CHLORIDE, SODIUM LACTATE AND CALCIUM CHLORIDE: 600; 310; 30; 20 INJECTION, SOLUTION INTRAVENOUS at 10:46

## 2022-08-11 RX ADMIN — ROCURONIUM BROMIDE 50 MG: 50 INJECTION, SOLUTION INTRAVENOUS at 10:51

## 2022-08-11 RX ADMIN — ONDANSETRON 4 MG: 2 INJECTION INTRAMUSCULAR; INTRAVENOUS at 14:14

## 2022-08-11 RX ADMIN — HYDROMORPHONE HYDROCHLORIDE 0.5 MG: 1 INJECTION, SOLUTION INTRAMUSCULAR; INTRAVENOUS; SUBCUTANEOUS at 11:44

## 2022-08-11 RX ADMIN — PHENYLEPHRINE HYDROCHLORIDE 0.25 MCG/KG/MIN: 10 INJECTION INTRAVENOUS at 11:07

## 2022-08-11 RX ADMIN — PROPOFOL 200 MG: 10 INJECTION, EMULSION INTRAVENOUS at 10:51

## 2022-08-11 RX ADMIN — NEOSTIGMINE METHYLSULFATE 4 MG: 1 INJECTION, SOLUTION INTRAVENOUS at 14:34

## 2022-08-11 RX ADMIN — SENNOSIDES AND DOCUSATE SODIUM 1 TABLET: 50; 8.6 TABLET ORAL at 20:57

## 2022-08-11 RX ADMIN — PROPOFOL 25 MCG/KG/MIN: 10 INJECTION, EMULSION INTRAVENOUS at 13:14

## 2022-08-11 RX ADMIN — ROCURONIUM BROMIDE 10 MG: 50 INJECTION, SOLUTION INTRAVENOUS at 12:25

## 2022-08-11 RX ADMIN — GLYCOPYRROLATE 0.2 MG: 0.2 INJECTION, SOLUTION INTRAMUSCULAR; INTRAVENOUS at 11:18

## 2022-08-11 RX ADMIN — DEXAMETHASONE SODIUM PHOSPHATE 4 MG: 4 INJECTION, SOLUTION INTRA-ARTICULAR; INTRALESIONAL; INTRAMUSCULAR; INTRAVENOUS; SOFT TISSUE at 11:14

## 2022-08-11 RX ADMIN — ROCURONIUM BROMIDE 10 MG: 50 INJECTION, SOLUTION INTRAVENOUS at 13:47

## 2022-08-11 RX ADMIN — ROCURONIUM BROMIDE 10 MG: 50 INJECTION, SOLUTION INTRAVENOUS at 11:23

## 2022-08-11 RX ADMIN — PHENYLEPHRINE HYDROCHLORIDE 100 MCG: 10 INJECTION INTRAVENOUS at 11:02

## 2022-08-11 RX ADMIN — ACETAMINOPHEN 975 MG: 325 TABLET ORAL at 16:58

## 2022-08-11 RX ADMIN — SODIUM CHLORIDE, POTASSIUM CHLORIDE, SODIUM LACTATE AND CALCIUM CHLORIDE: 600; 310; 30; 20 INJECTION, SOLUTION INTRAVENOUS at 14:42

## 2022-08-11 RX ADMIN — FENTANYL CITRATE 100 MCG: 50 INJECTION, SOLUTION INTRAMUSCULAR; INTRAVENOUS at 10:51

## 2022-08-11 RX ADMIN — ACETAMINOPHEN 975 MG: 325 TABLET ORAL at 23:34

## 2022-08-11 RX ADMIN — HEPARIN SODIUM 5000 UNITS: 5000 INJECTION, SOLUTION INTRAVENOUS; SUBCUTANEOUS at 09:52

## 2022-08-11 RX ADMIN — KETOROLAC TROMETHAMINE 15 MG: 15 INJECTION, SOLUTION INTRAMUSCULAR; INTRAVENOUS at 23:34

## 2022-08-11 RX ADMIN — SODIUM CHLORIDE, POTASSIUM CHLORIDE, SODIUM LACTATE AND CALCIUM CHLORIDE: 600; 310; 30; 20 INJECTION, SOLUTION INTRAVENOUS at 16:59

## 2022-08-11 RX ADMIN — PHENYLEPHRINE HYDROCHLORIDE 100 MCG: 10 INJECTION INTRAVENOUS at 11:52

## 2022-08-11 RX ADMIN — ROCURONIUM BROMIDE 10 MG: 50 INJECTION, SOLUTION INTRAVENOUS at 13:14

## 2022-08-11 RX ADMIN — PHENYLEPHRINE HYDROCHLORIDE 100 MCG: 10 INJECTION INTRAVENOUS at 12:00

## 2022-08-11 RX ADMIN — ROCURONIUM BROMIDE 10 MG: 50 INJECTION, SOLUTION INTRAVENOUS at 11:54

## 2022-08-11 RX ADMIN — Medication 2 G: at 10:51

## 2022-08-11 ASSESSMENT — ACTIVITIES OF DAILY LIVING (ADL)
ADLS_ACUITY_SCORE: 20
ADLS_ACUITY_SCORE: 20
ADLS_ACUITY_SCORE: 35
ADLS_ACUITY_SCORE: 20

## 2022-08-11 NOTE — ANESTHESIA PREPROCEDURE EVALUATION
Prior to Admission medications    Medication Sig Start Date End Date Taking? Authorizing Provider   childrens multivitamin w/iron (FLINTSTONES COMPLETE) 18 MG chewable tablet Take 1 chew tab by mouth every evening   Yes Reported, Patient     Current Facility-Administered Medications Ordered in Epic   Medication Dose Route Frequency Last Rate Last Admin     ceFAZolin Sodium (ANCEF) injection 2 g  2 g Intravenous Pre-Op/Pre-procedure x 1 dose         ceFAZolin Sodium (ANCEF) injection 2 g  2 g Intravenous See Admin Instructions         No current [facility]-ordered outpatient medications on file.       No results for input(s): ABO, RH in the last 30077 hours.  No results for input(s): HCG in the last 21433 hours.  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       Past Surgical History:   Procedure Laterality Date     C/SECTION, LOW TRANSVERSE           ESOPHAGOSCOPY, GASTROSCOPY, DUODENOSCOPY (EGD), COMBINED N/A 9/3/2021    Procedure: ESOPHAGOGASTRODUODENOSCOPY, WITH BIOPSY;  Surgeon: Mary Rodriguez MD;  Location:  GI     TONSILLECTOMY      childhood     TUBAL LIGATION Bilateral     after last  section      Allergies   Allergen Reactions     Seasonal Allergies       Social History     Tobacco Use     Smoking status: Never Smoker     Smokeless tobacco: Never Used   Substance Use Topics     Alcohol use: Not Currently     Comment: occasional glass of wine      Wt Readings from Last 1 Encounters:   22 80.3 kg (177 lb 1.6 oz)        Anesthesia Evaluation   Pt has had prior anesthetic.     No history of anesthetic complications       ROS/MED  HX  ENT/Pulmonary:    (-) sleep apnea   Neurologic:       Cardiovascular:       METS/Exercise Tolerance:     Hematologic:       Musculoskeletal:       GI/Hepatic: Comment: achalsia dxn 2022 june    (+) esophageal disease, hiatal hernia,     Renal/Genitourinary:       Endo:       Psychiatric/Substance Use:       Infectious Disease:       Malignancy:       Other:            Physical Exam    Airway        Mallampati: I    Neck ROM: full     Respiratory Devices and Support         Dental       (+) missing and chipped      Cardiovascular   cardiovascular exam normal          Pulmonary   pulmonary exam normal                OUTSIDE LABS:  CBC:   Lab Results   Component Value Date    HGB 10.5 (L) 08/02/2022     BMP:   Lab Results   Component Value Date    GLC 95 08/17/2021    GLC 81 04/30/2019     COAGS: No results found for: PTT, INR, FIBR  POC: No results found for: BGM, HCG, HCGS  HEPATIC: No results found for: ALBUMIN, PROTTOTAL, ALT, AST, GGT, ALKPHOS, BILITOTAL, BILIDIRECT, TALON  OTHER: No results found for: PH, LACT, A1C, ROXANN, PHOS, MAG, LIPASE, AMYLASE, TSH, T4, T3, CRP, SED    Anesthesia Plan    ASA Status:  2   NPO Status:  NPO Appropriate    Anesthesia Type: General.     - Airway: ETT   Induction: Intravenous.   Maintenance: Balanced.   Techniques and Equipment:     - Airway: Video-Laryngoscope         Consents    Anesthesia Plan(s) and associated risks, benefits, and realistic alternatives discussed. Questions answered and patient/representative(s) expressed understanding.    - Discussed:     - Discussed with:  Patient         Postoperative Care    Pain management: IV analgesics.   PONV prophylaxis: Ondansetron (or other 5HT-3)     Comments:                Hong Ramirez MD

## 2022-08-11 NOTE — PROGRESS NOTES
"New surgical pt. Arrived from PACU a little after 1700. A&Ox4. Pleasant. C/o severe back pain. Managed with PRNs. On a clears liquid diet. No N/V/D. IVF running per order. Educated on IS. DTV. Not OOB yet.  and daughter at bedside. No fever. AVSS. Latest Vitals: Blood pressure 131/74, pulse 79, temperature 98.3  F (36.8  C), temperature source Oral, resp. rate 16, height 1.651 m (5' 5\"), weight 80.3 kg (177 lb 1.6 oz), last menstrual period 07/23/2022, SpO2 93 %, not currently breastfeeding.        "

## 2022-08-11 NOTE — ANESTHESIA PROCEDURE NOTES
Airway       Patient location: [facility] - Operating Room or Procedural Area.       Procedure Start/Stop Times: 8/11/2022 10:53 AM and 8/11/2022 10:53 AM  Staff -        CRNA: Arthur Addison APRN CRNA       Performed By: CRNAIndications and Patient Condition       Indications for airway management: vandana-procedural and airway protection       Induction type:intravenous       Mask difficulty assessment: 1 - vent by mask    Final Airway Details       Final airway type: endotracheal airway       Successful airway: ETT - single and Oral  Endotracheal Airway Details        ETT size (mm): 7.0       Cuffed: yes       Successful intubation technique: video laryngoscopy       VL Blade Size: Perdomo 3       Grade View of Cords: 1       Adjucts: stylet       Position: Center       Measured from: gums/teeth       Secured at (cm): 22       Bite block used: None    Post intubation assessment        Placement verified by: capnometry, equal breath sounds and chest rise        Number of attempts at approach: 1       Secured with: pink tape       Ease of procedure: easy       Dentition: Intact and Unchanged    Medication(s) Administered   Medication Administration Time: 8/11/2022 10:53 AM    Additional Comments         Routine vandana-procedural airway protection.     Perdomo 3.    7.0 mm ID endotracheal tube.

## 2022-08-11 NOTE — OP NOTE
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  Robot-assisted paraesophageal hernia repair with was performed with complete reduction of the hernia sac and both posterior and anterior cruroplasty.  An esophagogastric Heller myotomy was performed with 6 cm of length over the distal esophagus and 2 cm in length over the gastric cardia for a total esophagogastric myotomy length of 8 cm.  A 270 degree posterior partial fundoplication (Toupet) was performed.  Intraoperative esophagogastroduodenoscopy was performed for leak test which was negative for leak at the conclusion of the myotomy.    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  Further work-up included high-resolution esophageal  manometry which was consistent with type II achalasia.  I evaluated the patient in the outpatient setting and discussed surgical options with her at length; we discussed the risks of the procedure and she was agreeable to proceed with minimally invasive paraesophageal hernia repair, Heller esophagomyotomy with partial fundoplication and EGD, for which she presents to the operating room today.    DESCRIPTION OF THE PROCEDURE: The risks, benefits, and alternatives were reviewed with the patient prior to proceeding to the operating room.  Consent was signed and placed in the patient's chart.  The patient was then brought to the operating room and placed in the supine position on the operating room table.  General endotracheal anesthesia was induced without complication.  SCDs were in place and functional at the time of anesthesia induction. A preoperative antibiotic was administered, as was chemical venous thromboembolism prophylaxis in the form of subcutaneous heparin.  The patient's right arm was placed on an armboard and left arm tucked at her side and all pressure points were padded and protected.  The patient's abdomen was then prepped and draped in a normal sterile fashion.  A timeout was performed prior to beginning the procedure.    The skin at Bauman's point in the left subcostal region was anesthetized with local anesthetic and a skin nick was created with the scalpel. A Veress needle was used to gain access into the peritoneal cavity; once placed intraperitoneally, a saline filled syringe was used to perform saline drop and aspiration tests which confirmed peritoneal entry.  Insufflation tubing was connected and pneumoperitoneum was established to a goal insufflation pressure of 15 mmHg.  The skin in the right subcostal region just lateral to the midclavicular line was anesthetized and an 8 mm skin incision was created with a scalpel; a 5 mm optical trocar was then inserted directly into the peritoneal  cavity under direct laparoscopic vision.  Inspection of the peritoneal cavity did not reveal any evidence of injury to the underlying viscera following initial Veress needle and trocar placement.    A TAP block was performed with 5 mL of 0.5% bupivacaine with epinephrine injected under laparoscopic guidance, which was administered at 3 separate sites on each anterior axillary line.    We then proceeded with additional port placement.  An 8 mm robotic port was placed just to the left of midline approximately 12 cm inferior to the xiphoid; this served as our camera port.  Two additional 8 mm trocars were placed in the left upper quadrant of the abdomen, one at the anterior axillary line and one approximately half-way between the camera port and the anterior axillary line port.  The initial 5 mm port was upsized to an 8 mm robotic port.  Finally, a 5 mm skin incision was created in the epigastrium and a Smith liver retractor was placed to retract the left lobe of the liver and expose the crura.  The da Edison robot was then brought in over the patient and docked, followed by introduction of robotic instruments through the previously placed ports.    A moderate-sized paraesophageal hernia was identified. The proximal stomach/cardia was herniating into the chest cavity through the hernia defect. The gastroesophageal junction was above the level of the diaphragm.     Attention was turned to the greater curvature of the stomach. Using the vessel sealer bipolar device, the short gastric vessels were divided starting in the midportion of the stomach and progressing proximally on the greater curvature up to the level of the angle of His. Dissection continued along the left avelino of the diaphragm by incising the peritoneum at this level starting inferiorly and progressing towards the apex of the hiatus. The hiatal hernia sac was dissected on the left side of the esophagus and reduced into the peritoneal cavity, allowing  mobilization of the stomach on this side. At this point, we turned our attention to the patient's right side. The pars flaccida of the gastro-hepatic ligament was divided with the vessel sealer device. This allowed identification of the right avelino of the diaphragm. The peritoneum medial to the right avelino was incised with a cautery starting inferiorly and progressing superiorly towards the apex of the hiatus. The paraesophageal hernia sac was reduced in the peritoneal cavity on the right side. This allowed further mobilization of the stomach and partial reduction into the abdominal cavity. A retroesophageal window was created and a 1/4 inch Penrose drain was placed through this window encircling the esophagus. Using retraction on the Penrose drain, complete 360  circumferential esophageal dissection was performed, allowing reduction of the gastroesophageal junction into the abdominal cavity and having the segment of intra-abdominal esophagus of at least 4 cm. During this part of the operation, as well as further in the case, great attention was given to protect both vagus nerves and avoid injury to these structures, as well as to avoid inadvertent entry through the parietal pleura on either side.    Full and complete mobilization of the esophagus, we proceeded with the Heller esophagogastric myotomy.  Our anesthesia colleagues passed a lighted 52 Fr bougie into the proximal stomach under direct vision. The anterior/left vagus nerve was identified as it crossed over the anterior esophagus; great care was taken to protect the structure during the myotomy.  The vagus nerve was gently lifted off the esophageal muscle fibers using a combination of the hook and robotic homero to isolate it from the esophagus and provide gentle retraction in order to perform the myotomy. The robotic hook cautery was used to kathi the line for the proposed myotomy--this proceeded 6 cm along the distal esophagus anteriorly to the GE junction,  and an additional 2 cm onto the gastric cardia anteriorly.  Using careful dissection with the hook, the longitudinal and circular muscle fibers of the esophagus were carefully released along the length of the myotomy; Firefly imaging was used to identify the mucosa of the esophagus during the myotomy in order to protect it from full-thickness esophageal injury. Small oozing from the released esophageal muscle fibers was controlled with a rolled gauze that had been dampened with epinephrine solution.  Next we turned our attention to repairing the hiatal defect.    The defect was moderate size in diameter, though the endoabdominal fascia overlying both crura appeared intact. In order to close the diaphragmatic defect, we first performed a posterior cruroplasty with a 2-0 Stratafix barbed non-absorbable suture in running fashion. There was some laxity anteriorly over the crura, thus we also performed an anterior crura plasty using a single figure-of-eight interrupted 0-Ethibond suture. Once the suturing was completed, the hiatus allowed normal passage of the abdominal esophagus, without any tension or stricture.  A 5 mm instrument was able to be slid easily alongside the esophagus into the mediastinum.    We proceeded with creation of the Toupet fundoplication. The previous lighted bougie was used to calibrate the Toupet wrap and avoid distal esophageal stricture. The stomach fundus was brought through the retroesophageal window and a 270  posterior wrap was created using 6 interrupted 2-0 Ethibond sutures; 3 sutures were used immediately anterior to the right avelino between the fundus and the muscle fibers of the cut edge along the right side of the myotomy, and in a similar manner, 3 sutures were used immediately anterior to the left avelino, between the posterior fundus and the muscle fibers of the cut edge along the lef side of the myotomy. A loose wrap was achieved around the abdominal portion of the esophagus.  Finally, a single gastropexy suture was placed to secure the posterior fundus of the wrap to the preaortic fascia in modified Hill fashion; this suture was another 0-Ethibond suture in interrupted fashion. The bougie was withdrawn from the stomach. The Penrose drain was removed from the abdominal cavity.    We then proceeded with intraoperative upper endoscopy to evaluate the esophagus and gastric cardia following myotomy and to perform an intraoperative leak test. The gastroscope was inserted through the patient's mouth, advanced slowly without resistance down the esophagus and into the stomach, taking care to visualize the lumen of both structures; there was a moderate amount of undigested food and fluid along the mid- and distal esophagus. There was no evidence of mucosal injury on direct visualization; CO2 gas was gently insufflated into the lumen of the distal esophagus and the wrap and distal esophagus were submerged under saline with a suction  device. No bubbles were identified during this maneuver, confirming a negative leak test. The scope was removed from the esophagus after the CO2 gas was evacuated from the stomach.    The Smith retractor was removed under direct vision. The fascia of the 12 mm port was closed with an 0-Vicryl suture with the aid of the Jaiden Aj fascial closure device. Afterwards the CO2 was desufflated and all ports removed. The skin of all incisions was closed with 4-0 Monocryl suture in subcuticular fashion followed by application of Exofin skin glue.    The patient tolerated the procedure well. She was awakened from anesthesia and moved to the postanesthesia care unit in stable condition. All instrument, needle and sponge counts were correct at the end of the case. I was present in the operating room for the entire duration of the procedure. There were no immediate complications.     Dr. Daniel Arroyo served as the first-assistant for this case as there was no  qualified resident available to first assist.  He assisted with port placement, retraction, robotic instrument exchange and closure.     Francis Ferguson MD  General & Bariatric Surgery  [facility]

## 2022-08-11 NOTE — ANESTHESIA POSTPROCEDURE EVALUATION
Patient: Opal Muller    Procedure: Procedure(s):  ROBOT ASSISTED LAPAROSCOPIC HELLER MYOTOMY WITH PARAESOPHAGEAL HIATAL HERNIA REPAIR AND PARTIAL FUNDOPLICATION/ TOUPET FUNDOPLICATION WITH EGD  Esophagoscopy, gastroscopy, duodenoscopy (EGD), combined       Anesthesia Type:  General    Note:  Disposition: Outpatient   Postop Pain Control: Uneventful            Sign Out: Well controlled pain   PONV: No   Neuro/Psych: Uneventful            Sign Out: Acceptable/Baseline neuro status   Airway/Respiratory: Uneventful            Sign Out: Acceptable/Baseline resp. status   CV/Hemodynamics: Uneventful            Sign Out: Acceptable CV status; No obvious hypovolemia; No obvious fluid overload   Other NRE: NONE   DID A NON-ROUTINE EVENT OCCUR? No           Last vitals:  Vitals Value Taken Time   BP 99/67 08/11/22 1600   Temp     Pulse 70 08/11/22 1606   Resp 13 08/11/22 1606   SpO2 93 % 08/11/22 1606   Vitals shown include unvalidated device data.    Electronically Signed By: Hong Ramirez MD  August 11, 2022  4:08 PM

## 2022-08-11 NOTE — ANESTHESIA CARE TRANSFER NOTE
Patient: Opal Muller    Procedure: Procedure(s):  ROBOT ASSISTED LAPAROSCOPIC HELLER MYOTOMY WITH PARAESOPHAGEAL HIATAL HERNIA REPAIR AND PARTIAL FUNDOPLICATION/ TOUPET FUNDOPLICATION WITH EGD  Esophagoscopy, gastroscopy, duodenoscopy (EGD), combined       Diagnosis: Achalasia [K22.0]  GERD (gastroesophageal reflux disease) [K21.9]  Diagnosis Additional Information: No value filed.    Anesthesia Type:   General     Note:    Oropharynx: oropharynx clear of all foreign objects and spontaneously breathing  Level of Consciousness: awake  Oxygen Supplementation: face mask  Level of Supplemental Oxygen (L/min / FiO2): 6  Independent Airway: airway patency satisfactory and stable  Dentition: dentition unchanged  Vital Signs Stable: post-procedure vital signs reviewed and stable  Report to RN Given: handoff report given  Patient transferred to: PACU  Comments:     Neuromuscular blockade reversed after TOF 4/4, spontaneous respirations, adequate tidal volumes, followed commands to voice, oropharynx suctioned with soft flexible catheter, extubated atraumatically, extubated with suction, airway patent after extubation.  Oxygen via facemask at 6 liters per minute to PACU. Oxygen tubing connected to wall O2 in PACU, SpO2, NiBP, and EKG monitors and alarms on and functioning, report on patient's clinical status given to PACU RN, RN questions answered.         Handoff Report: Identifed the Patient, Identified the Reponsible Provider, Reviewed the pertinent medical history, Discussed the surgical course, Reviewed Intra-OP anesthesia mangement and issues during anesthesia, Set expectations for post-procedure period and Allowed opportunity for questions and acknowledgement of understanding      Vitals: See Anesthesia Record.    Electronically Signed By: KIKE Hassan CRNA  August 11, 2022  3:17 PM

## 2022-08-12 VITALS
OXYGEN SATURATION: 96 % | RESPIRATION RATE: 18 BRPM | HEART RATE: 79 BPM | DIASTOLIC BLOOD PRESSURE: 74 MMHG | SYSTOLIC BLOOD PRESSURE: 129 MMHG | TEMPERATURE: 97.9 F | HEIGHT: 65 IN | BODY MASS INDEX: 29.51 KG/M2 | WEIGHT: 177.1 LBS

## 2022-08-12 LAB
ANION GAP SERPL CALCULATED.3IONS-SCNC: 5 MMOL/L (ref 3–14)
BUN SERPL-MCNC: 8 MG/DL (ref 7–30)
CALCIUM SERPL-MCNC: 8.7 MG/DL (ref 8.5–10.1)
CHLORIDE BLD-SCNC: 109 MMOL/L (ref 94–109)
CO2 SERPL-SCNC: 23 MMOL/L (ref 20–32)
CREAT SERPL-MCNC: 0.68 MG/DL (ref 0.52–1.04)
ERYTHROCYTE [DISTWIDTH] IN BLOOD BY AUTOMATED COUNT: 15.7 % (ref 10–15)
FASTING STATUS PATIENT QL REPORTED: NO
GFR SERPL CREATININE-BSD FRML MDRD: >90 ML/MIN/1.73M2
GLUCOSE BLD-MCNC: 91 MG/DL (ref 70–99)
GLUCOSE BLD-MCNC: 91 MG/DL (ref 70–99)
HCT VFR BLD AUTO: 33 % (ref 35–47)
HGB BLD-MCNC: 10.3 G/DL (ref 11.7–15.7)
MCH RBC QN AUTO: 24.8 PG (ref 26.5–33)
MCHC RBC AUTO-ENTMCNC: 31.2 G/DL (ref 31.5–36.5)
MCV RBC AUTO: 79 FL (ref 78–100)
PLATELET # BLD AUTO: 213 10E3/UL (ref 150–450)
POTASSIUM BLD-SCNC: 4.1 MMOL/L (ref 3.4–5.3)
RBC # BLD AUTO: 4.16 10E6/UL (ref 3.8–5.2)
SODIUM SERPL-SCNC: 137 MMOL/L (ref 133–144)
WBC # BLD AUTO: 9.3 10E3/UL (ref 4–11)

## 2022-08-12 PROCEDURE — 250N000013 HC RX MED GY IP 250 OP 250 PS 637: Performed by: SURGERY

## 2022-08-12 PROCEDURE — 82310 ASSAY OF CALCIUM: CPT | Performed by: SURGERY

## 2022-08-12 PROCEDURE — 85027 COMPLETE CBC AUTOMATED: CPT | Performed by: SURGERY

## 2022-08-12 PROCEDURE — 36415 COLL VENOUS BLD VENIPUNCTURE: CPT | Performed by: SURGERY

## 2022-08-12 PROCEDURE — 250N000011 HC RX IP 250 OP 636: Performed by: SURGERY

## 2022-08-12 PROCEDURE — 96372 THER/PROPH/DIAG INJ SC/IM: CPT | Performed by: SURGERY

## 2022-08-12 PROCEDURE — 258N000003 HC RX IP 258 OP 636: Performed by: SURGERY

## 2022-08-12 RX ORDER — ONDANSETRON 4 MG/1
4 TABLET, ORALLY DISINTEGRATING ORAL EVERY 6 HOURS PRN
Qty: 25 TABLET | Refills: 1 | Status: SHIPPED | OUTPATIENT
Start: 2022-08-12 | End: 2023-02-24

## 2022-08-12 RX ORDER — PANTOPRAZOLE SODIUM 40 MG/1
40 TABLET, DELAYED RELEASE ORAL DAILY
Qty: 30 TABLET | Refills: 2 | Status: SHIPPED | OUTPATIENT
Start: 2022-08-12 | End: 2024-05-29

## 2022-08-12 RX ORDER — OXYCODONE HYDROCHLORIDE 5 MG/1
5 TABLET ORAL EVERY 6 HOURS PRN
Qty: 20 TABLET | Refills: 0 | Status: SHIPPED | OUTPATIENT
Start: 2022-08-12 | End: 2022-08-17

## 2022-08-12 RX ORDER — ACETAMINOPHEN 500 MG
500-1000 TABLET ORAL EVERY 6 HOURS PRN
Qty: 30 TABLET | Refills: 1 | Status: SHIPPED | OUTPATIENT
Start: 2022-08-12 | End: 2023-02-24

## 2022-08-12 RX ADMIN — KETOROLAC TROMETHAMINE 15 MG: 15 INJECTION, SOLUTION INTRAMUSCULAR; INTRAVENOUS at 05:47

## 2022-08-12 RX ADMIN — SENNOSIDES AND DOCUSATE SODIUM 1 TABLET: 50; 8.6 TABLET ORAL at 08:47

## 2022-08-12 RX ADMIN — KETOROLAC TROMETHAMINE 15 MG: 15 INJECTION, SOLUTION INTRAMUSCULAR; INTRAVENOUS at 11:20

## 2022-08-12 RX ADMIN — OXYCODONE HYDROCHLORIDE 10 MG: 5 TABLET ORAL at 08:47

## 2022-08-12 RX ADMIN — ACETAMINOPHEN 975 MG: 325 TABLET ORAL at 15:44

## 2022-08-12 RX ADMIN — OXYCODONE HYDROCHLORIDE 10 MG: 5 TABLET ORAL at 04:56

## 2022-08-12 RX ADMIN — ACETAMINOPHEN 975 MG: 325 TABLET ORAL at 08:47

## 2022-08-12 RX ADMIN — POLYETHYLENE GLYCOL 3350 17 G: 17 POWDER, FOR SOLUTION ORAL at 08:47

## 2022-08-12 RX ADMIN — HEPARIN SODIUM 5000 UNITS: 5000 INJECTION, SOLUTION INTRAVENOUS; SUBCUTANEOUS at 08:47

## 2022-08-12 RX ADMIN — PANTOPRAZOLE SODIUM 40 MG: 40 TABLET, DELAYED RELEASE ORAL at 08:47

## 2022-08-12 RX ADMIN — SODIUM CHLORIDE, POTASSIUM CHLORIDE, SODIUM LACTATE AND CALCIUM CHLORIDE: 600; 310; 30; 20 INJECTION, SOLUTION INTRAVENOUS at 04:49

## 2022-08-12 RX ADMIN — OXYCODONE HYDROCHLORIDE 10 MG: 5 TABLET ORAL at 13:18

## 2022-08-12 ASSESSMENT — ACTIVITIES OF DAILY LIVING (ADL)
ADLS_ACUITY_SCORE: 20

## 2022-08-12 NOTE — PROGRESS NOTES
Discharge criteria met. Discharge order received. All discharge instructions reviewed with pt. Pt verbalized understanding. All questions answered. PIV removed. Rx filled and given to pt. All personal belongs returned to pt. Pt left the unit via W/C in stable condition accompanied with staff and .

## 2022-08-12 NOTE — DISCHARGE SUMMARY
[facility] Discharge Summary    Opal Muller MRN# [id number]   Age: 41 year old YOB: 1980     Date of Admission:  8/11/2022  Date of Discharge::  8/12/2022  Admitting Physician:  Francis Franco MD  Discharge Physician:  FRANCIS FRANCO MD     Home clinic: [facility]          Admission Diagnoses:   Achalasia [K22.0]  GERD (gastroesophageal reflux disease) [K21.9]  Paraesophageal hernia          Discharge Diagnosis:   Achalasia [K22.0]  GERD (gastroesophageal reflux disease) [K21.9]  Paraesophageal hernia          Procedures:   Procedure(s): Robot-assisted laparoscopic Heller esophagomyotomy with paraesophageal hernia repair and Toupet fundoplication, esophagogastroduodenoscopy       No other procedures performed during this admission           Medications Prior to Admission:     Medications Prior to Admission   Medication Sig Dispense Refill Last Dose     childrens multivitamin w/iron (FLINTSTONES COMPLETE) 18 MG chewable tablet Take 1 chew tab by mouth every evening   8/10/2022 at PM             Discharge Medications:     Current Discharge Medication List      START taking these medications    Details   acetaminophen (TYLENOL) 500 MG tablet Take 1-2 tablets (500-1,000 mg) by mouth every 6 hours as needed for mild pain (Take as needed for mild pain)  Qty: 30 tablet, Refills: 1    Associated Diagnoses: Achalasia      ondansetron (ZOFRAN ODT) 4 MG ODT tab Take 1 tablet (4 mg) by mouth every 6 hours as needed for nausea or vomiting (as needed for nausea or vomiting after surgery)  Qty: 25 tablet, Refills: 1    Associated Diagnoses: Achalasia      oxyCODONE (ROXICODONE) 5 MG tablet Take 1 tablet (5 mg) by mouth every 6 hours as needed for moderate to severe pain  Qty: 20 tablet, Refills: 0    Associated Diagnoses: Achalasia      pantoprazole (PROTONIX) 40 MG EC tablet Take 1 tablet (40 mg) by mouth daily  Qty: 30 tablet, Refills: 2    Associated Diagnoses: Cavanaugh's esophagus  determined by biopsy         CONTINUE these medications which have NOT CHANGED    Details   childrens multivitamin w/iron (FLINTSTONES COMPLETE) 18 MG chewable tablet Take 1 chew tab by mouth every evening                   Consultations:   No consultations were requested during this admission          Brief History of Illness:   Reason for admission requiring a surgical or invasive procedure:   Achalasia [K22.0]  GERD (gastroesophageal reflux disease) [K21.9]  Paraesophageal hernia   The patient underwent the following procedure(s):   Robot-assisted laparoscopic Heller esophagomyotomy with paraesophageal hernia repair and Toupet fundoplication, esophagogastroduodenoscopy   There were no immediate complications during this procedure.    Please refer to the full operative summary for details.             Hospital Course:   The patient's hospital course was unremarkable.  She recovered as anticipated and experienced no post-operative complications. On POD#1, she wastolerating a clear liquid diet, pain was controlled on PO pain regimen, and she was ambulating well. Vital signs normal on POD#1 and afebrile. Postoperative labs showed chronic anemia with stable Hb (10.3 from 10.5); no leukocytosis. Decision was made to discharge the patient; she was discharged home on POD#1 in good condition.    General: Alert, oriented, in no acute distress resting comfortably on hospital bed  HEENT: Head is normocephalic, atraumatic, mucous membranes are moist.  Nares are patent.  Eyes: Pupils equal round and reactive to light and accommodation, extraocular movements are intact  Cardiovascular: Normal peripheral perfusion, no digital clubbing  Respiratory: Nonlabored respirations, no audible wheezes or rhonchi.  Symmetrical chest wall expansion.  Gastrointestinal: Abdomen is soft, nondistended, appropriately tender to palpation vandana-incisionally.  There is no rebound, guarding, or signs of peritonitis. Laparoscopic port sites c/d/i with  skin glue in place  Musculoskeletal: Normal strength and tone of all 4 extremities, no muscle wasting appreciated  Integumentary: Skin is warm, dry, intact, without jaundice  Neurological: Alert, oriented, GCS 15.  No focal neurological deficits noted.  Cranial nerves II through XII are grossly intact  Psychiatric: Appropriate mood and affect. Insight and judgement intact          Discharge Instructions and Follow-Up:   Discharge diet: Full liquid   Discharge activity: No lifting (>30 lbs) or strenuous exercise for 6 week(s)   Discharge follow-up: Follow up with Dr. Francis Franco in 2 weeks   Wound care: Skin glue in place-- allow this to fall off on its own (~10-14 days). Avoid soaking/submerging incisions under water for 2 weeks; ok to shower 24 hours postoperatively-- allow warm soapy water to run over skin incisions and pat dry.           Discharge Disposition:   Discharged to home      Attestation:  I have reviewed today's vital signs, notes, medications, labs and imaging.  Amount of time performed on this discharge summary: <30 minutes.    FRANCIS FRANCO MD

## 2022-08-12 NOTE — PLAN OF CARE
Goal Outcome Evaluation:        Pt is A&Ox4, VSS on RA, tolerating clear liquid diet, denies N/V, hypoactive BS, abd lap sites CDI, mod back/abd pain managed with PRN oxycodone and warm packs, up to the bathroom twice with SBA, voiding adequately, discharge pending.

## 2022-09-28 ENCOUNTER — ANCILLARY PROCEDURE (OUTPATIENT)
Dept: MAMMOGRAPHY | Facility: CLINIC | Age: 42
End: 2022-09-28
Attending: PHYSICIAN ASSISTANT
Payer: COMMERCIAL

## 2022-09-28 DIAGNOSIS — Z12.31 VISIT FOR SCREENING MAMMOGRAM: ICD-10-CM

## 2022-09-28 PROCEDURE — 77067 SCR MAMMO BI INCL CAD: CPT | Mod: TC | Performed by: RADIOLOGY

## 2022-09-28 PROCEDURE — 77063 BREAST TOMOSYNTHESIS BI: CPT | Mod: TC | Performed by: RADIOLOGY

## 2022-10-22 ENCOUNTER — HEALTH MAINTENANCE LETTER (OUTPATIENT)
Age: 42
End: 2022-10-22

## 2022-11-21 ASSESSMENT — ENCOUNTER SYMPTOMS
NERVOUS/ANXIOUS: 0
JOINT SWELLING: 0
HEMATOCHEZIA: 0
FREQUENCY: 0
HEARTBURN: 0
BREAST MASS: 0
DYSURIA: 0
SHORTNESS OF BREATH: 0
PALPITATIONS: 0
SORE THROAT: 0
CHILLS: 0
DIZZINESS: 0
DIARRHEA: 0
HEADACHES: 0
FEVER: 0
CONSTIPATION: 0
EYE PAIN: 0
WEAKNESS: 0
COUGH: 0
HEMATURIA: 0
NAUSEA: 0
ABDOMINAL PAIN: 0
PARESTHESIAS: 0
ARTHRALGIAS: 0
MYALGIAS: 0

## 2022-11-22 ENCOUNTER — OFFICE VISIT (OUTPATIENT)
Dept: FAMILY MEDICINE | Facility: CLINIC | Age: 42
End: 2022-11-22
Payer: COMMERCIAL

## 2022-11-22 VITALS
HEART RATE: 73 BPM | HEIGHT: 65 IN | BODY MASS INDEX: 31.16 KG/M2 | TEMPERATURE: 97.3 F | SYSTOLIC BLOOD PRESSURE: 132 MMHG | OXYGEN SATURATION: 100 % | DIASTOLIC BLOOD PRESSURE: 88 MMHG | WEIGHT: 187 LBS

## 2022-11-22 DIAGNOSIS — D62 ANEMIA DUE TO BLOOD LOSS, ACUTE: ICD-10-CM

## 2022-11-22 DIAGNOSIS — Z00.00 ENCOUNTER FOR ROUTINE ADULT HEALTH EXAMINATION WITHOUT ABNORMAL FINDINGS: Primary | ICD-10-CM

## 2022-11-22 DIAGNOSIS — Z91.09 ENVIRONMENTAL ALLERGIES: ICD-10-CM

## 2022-11-22 DIAGNOSIS — Z12.4 CERVICAL CANCER SCREENING: ICD-10-CM

## 2022-11-22 LAB
ERYTHROCYTE [DISTWIDTH] IN BLOOD BY AUTOMATED COUNT: 14.7 % (ref 10–15)
HCT VFR BLD AUTO: 35.7 % (ref 35–47)
HGB BLD-MCNC: 10.9 G/DL (ref 11.7–15.7)
MCH RBC QN AUTO: 24.3 PG (ref 26.5–33)
MCHC RBC AUTO-ENTMCNC: 30.5 G/DL (ref 31.5–36.5)
MCV RBC AUTO: 80 FL (ref 78–100)
PLATELET # BLD AUTO: 269 10E3/UL (ref 150–450)
RBC # BLD AUTO: 4.49 10E6/UL (ref 3.8–5.2)
WBC # BLD AUTO: 6.8 10E3/UL (ref 4–11)

## 2022-11-22 PROCEDURE — 36415 COLL VENOUS BLD VENIPUNCTURE: CPT | Performed by: PHYSICIAN ASSISTANT

## 2022-11-22 PROCEDURE — 87624 HPV HI-RISK TYP POOLED RSLT: CPT | Performed by: PHYSICIAN ASSISTANT

## 2022-11-22 PROCEDURE — G0145 SCR C/V CYTO,THINLAYER,RESCR: HCPCS | Performed by: PHYSICIAN ASSISTANT

## 2022-11-22 PROCEDURE — 85027 COMPLETE CBC AUTOMATED: CPT | Performed by: PHYSICIAN ASSISTANT

## 2022-11-22 PROCEDURE — 99396 PREV VISIT EST AGE 40-64: CPT | Performed by: PHYSICIAN ASSISTANT

## 2022-11-22 PROCEDURE — 99213 OFFICE O/P EST LOW 20 MIN: CPT | Mod: 25 | Performed by: PHYSICIAN ASSISTANT

## 2022-11-22 ASSESSMENT — ENCOUNTER SYMPTOMS
PARESTHESIAS: 0
SORE THROAT: 0
HEMATOCHEZIA: 0
SHORTNESS OF BREATH: 0
NAUSEA: 0
EYE PAIN: 0
CHILLS: 0
DIZZINESS: 0
WEAKNESS: 0
DIARRHEA: 0
HEARTBURN: 0
HEADACHES: 0
JOINT SWELLING: 0
FEVER: 0
ARTHRALGIAS: 0
DYSURIA: 0
COUGH: 0
FREQUENCY: 0
NERVOUS/ANXIOUS: 0
CONSTIPATION: 0
ABDOMINAL PAIN: 0
BREAST MASS: 0
HEMATURIA: 0
PALPITATIONS: 0
MYALGIAS: 0

## 2022-11-22 ASSESSMENT — PAIN SCALES - GENERAL: PAINLEVEL: NO PAIN (0)

## 2022-11-22 NOTE — PROGRESS NOTES
SUBJECTIVE:   CC: Sushila is an 42 year old who presents for preventive health visit.   Patient has been advised of split billing requirements and indicates understanding: Yes  Healthy Habits:     Getting at least 3 servings of Calcium per day:  NO    Bi-annual eye exam:  NO    Dental care twice a year:  NO    Sleep apnea or symptoms of sleep apnea:  None    Diet:  Regular (no restrictions)    Frequency of exercise:  None    Taking medications regularly:  Yes    Medication side effects:  None    PHQ-2 Total Score: 2    Additional concerns today:  Yes    Recheck CBC - anemia possibly due to malabsorption.      Today's PHQ-2 Score:   PHQ-2 (  [employer]) 2022   Q1: Little interest or pleasure in doing things 1   Q2: Feeling down, depressed or hopeless 1   PHQ-2 Score 2   PHQ-2 Total Score (12-17 Years)- Positive if 3 or more points; Administer PHQ-A if positive -   Q1: Little interest or pleasure in doing things Several days   Q2: Feeling down, depressed or hopeless Several days   PHQ-2 Score 2           Social History     Tobacco Use     Smoking status: Never     Smokeless tobacco: Never   Substance Use Topics     Alcohol use: Not Currently     Comment: occasional glass of wine         Alcohol Use 2022   Prescreen: >3 drinks/day or >7 drinks/week? No   Prescreen: >3 drinks/day or >7 drinks/week? -       Reviewed orders with patient.  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     Achalasia     Past Surgical History:   Procedure Laterality Date     C/SECTION, LOW TRANSVERSE           DAVINCI NISSEN FUNDOPLICATION N/A 2022    Procedure: ROBOT ASSISTED LAPAROSCOPIC HELLER MYOTOMY WITH PARAESOPHAGEAL HIATAL HERNIA REPAIR AND PARTIAL FUNDOPLICATION/ TOUPET FUNDOPLICATION WITH EGD;  Surgeon: Francis Ferguson MD;  Location:  OR     ESOPHAGOSCOPY, GASTROSCOPY, DUODENOSCOPY (EGD), COMBINED  N/A 9/3/2021    Procedure: ESOPHAGOGASTRODUODENOSCOPY, WITH BIOPSY;  Surgeon: Mary Rodriguez MD;  Location:  GI     ESOPHAGOSCOPY, GASTROSCOPY, DUODENOSCOPY (EGD), COMBINED N/A 2022    Procedure: Esophagoscopy, gastroscopy, duodenoscopy (EGD), combined;  Surgeon: Francis Ferguson MD;  Location:  OR     TONSILLECTOMY      childhood     TUBAL LIGATION Bilateral     after last  section       Social History     Tobacco Use     Smoking status: Never     Smokeless tobacco: Never   Substance Use Topics     Alcohol use: Not Currently     Comment: occasional glass of wine     Family History   Problem Relation Age of Onset     Multiple Sclerosis Mother      Hypertension Father      Hyperlipidemia Father      Eye Disorder Father      Aneurysm Father         thoracic     Skin Cancer Maternal Grandmother         severe burn injuries, likely Marjolin's ulcer         Current Outpatient Medications   Medication Sig Dispense Refill     acetaminophen (TYLENOL) 500 MG tablet Take 1-2 tablets (500-1,000 mg) by mouth every 6 hours as needed for mild pain (Take as needed for mild pain) 30 tablet 1     childrens multivitamin w/iron (FLINTSTONES COMPLETE) 18 MG chewable tablet Take 1 chew tab by mouth every evening       ondansetron (ZOFRAN ODT) 4 MG ODT tab Take 1 tablet (4 mg) by mouth every 6 hours as needed for nausea or vomiting (as needed for nausea or vomiting after surgery) 25 tablet 1     pantoprazole (PROTONIX) 40 MG EC tablet Take 1 tablet (40 mg) by mouth daily 30 tablet 2     Allergies   Allergen Reactions     Seasonal Allergies        Breast Cancer Screening:    FHS-7:   Breast CA Risk Assessment (FHS-7) 2022   Did any of your first-degree relatives have breast or ovarian cancer? No No No No No   Did any of your relatives have bilateral breast cancer? No No Unknown No Unknown   Did any man in your family have breast cancer? Yes Yes Yes Yes Yes   Did any woman in  "your family have breast and ovarian cancer? Yes No Unknown No Unknown   Did any woman in your family have breast cancer before age 50 y? Yes Unknown Unknown No Yes   Do you have 2 or more relatives with breast and/or ovarian cancer? Unknown Yes Yes No Yes   Do you have 2 or more relatives with breast and/or bowel cancer? Unknown Yes Yes No Unknown         Pertinent mammograms are reviewed under the imaging tab.    History of abnormal Pap smear: NO - age 30-65 PAP every 5 years with negative HPV co-testing recommended  PAP / HPV Latest Ref Rng & Units 4/17/2017   PAP (Historical) - NIL   HPV16 NEG Negative   HPV18 NEG Negative   HRHPV NEG Negative     Reviewed and updated as needed this visit by clinical staff   Tobacco  Allergies  Meds              Reviewed and updated as needed this visit by Provider                     Review of Systems   Constitutional: Negative for chills and fever.   HENT: Negative for congestion, ear pain, hearing loss and sore throat.    Eyes: Negative for pain and visual disturbance.   Respiratory: Negative for cough and shortness of breath.    Cardiovascular: Negative for chest pain, palpitations and peripheral edema.   Gastrointestinal: Negative for abdominal pain, constipation, diarrhea, heartburn, hematochezia and nausea.   Breasts:  Negative for tenderness, breast mass and discharge.   Genitourinary: Negative for dysuria, frequency, genital sores, hematuria, pelvic pain, urgency, vaginal bleeding and vaginal discharge.   Musculoskeletal: Negative for arthralgias, joint swelling and myalgias.   Skin: Negative for rash.   Neurological: Negative for dizziness, weakness, headaches and paresthesias.   Psychiatric/Behavioral: Negative for mood changes. The patient is not nervous/anxious.           OBJECTIVE:   /88   Pulse 73   Temp 97.3  F (36.3  C) (Temporal)   Ht 1.651 m (5' 5\")   Wt 84.8 kg (187 lb)   SpO2 100%   BMI 31.12 kg/m    Physical Exam  Constitutional:       " General: She is not in acute distress.     Appearance: She is well-developed.   HENT:      Right Ear: Tympanic membrane and external ear normal.      Left Ear: Tympanic membrane and external ear normal.   Eyes:      General:         Right eye: No discharge.         Left eye: No discharge.      Conjunctiva/sclera: Conjunctivae normal.      Pupils: Pupils are equal, round, and reactive to light.   Neck:      Thyroid: No thyromegaly.      Trachea: No tracheal deviation.   Cardiovascular:      Rate and Rhythm: Normal rate and regular rhythm.      Pulses: Normal pulses.      Heart sounds: Normal heart sounds, S1 normal and S2 normal. No murmur heard.    No friction rub. No S3 or S4 sounds.   Pulmonary:      Effort: Pulmonary effort is normal. No respiratory distress.      Breath sounds: Normal breath sounds. No wheezing or rales.   Abdominal:      Palpations: Abdomen is soft. There is no mass.      Tenderness: There is no abdominal tenderness.   Genitourinary:     Vagina: Normal.      Cervix: Cervical bleeding present. No cervical motion tenderness.   Musculoskeletal:         General: Normal range of motion.      Cervical back: Neck supple.   Lymphadenopathy:      Cervical: No cervical adenopathy.   Skin:     General: Skin is warm and dry.      Findings: No rash.   Neurological:      Mental Status: She is alert and oriented to person, place, and time.      Motor: No abnormal muscle tone.   Psychiatric:         Thought Content: Thought content normal.         Judgment: Judgment normal.           Diagnostic Test Results:  Results for orders placed or performed in visit on 11/22/22 (from the past 24 hour(s))   CBC with platelets   Result Value Ref Range    WBC Count 6.8 4.0 - 11.0 10e3/uL    RBC Count 4.49 3.80 - 5.20 10e6/uL    Hemoglobin 10.9 (L) 11.7 - 15.7 g/dL    Hematocrit 35.7 35.0 - 47.0 %    MCV 80 78 - 100 fL    MCH 24.3 (L) 26.5 - 33.0 pg    MCHC 30.5 (L) 31.5 - 36.5 g/dL    RDW 14.7 10.0 - 15.0 %    Platelet  "Count 269 150 - 450 10e3/uL       ASSESSMENT/PLAN:   Opal was seen today for physical.    Diagnoses and all orders for this visit:    Encounter for routine adult health examination without abnormal findings  -     CBC with platelets; Future  -     CBC with platelets    Cervical cancer screening  -     Pap Screen with HPV - recommended age 30 - 65 years    Anemia due to blood loss, acute  -     CBC with platelets; Future  -     CBC with platelets    Environmental allergies  -     Adult Allergy/Asthma Referral; Future    Other orders  -     Cancel: INFLUENZA VACCINE IM > 6 MONTHS VALENT IIV4 (AFLURIA/FLUZONE)      Referral to allergy for environmental allergies at work (cinnamon pine cones).   Recheck CBC due to anemia.           COUNSELING:  Reviewed preventive health counseling, as reflected in patient instructions      BMI:   Estimated body mass index is 31.12 kg/m  as calculated from the following:    Height as of this encounter: 1.651 m (5' 5\").    Weight as of this encounter: 84.8 kg (187 lb).   Weight management plan: Discussed healthy diet and exercise guidelines      She reports that she has never smoked. She has never used smokeless tobacco.          Kaley Payne PA-C  [facility]  "

## 2022-11-25 LAB
BKR LAB AP GYN ADEQUACY: NORMAL
BKR LAB AP GYN INTERPRETATION: NORMAL
BKR LAB AP HPV REFLEX: NORMAL
BKR LAB AP PREVIOUS ABNORMAL: NORMAL
PATH REPORT.COMMENTS IMP SPEC: NORMAL
PATH REPORT.COMMENTS IMP SPEC: NORMAL
PATH REPORT.RELEVANT HX SPEC: NORMAL

## 2022-11-28 NOTE — RESULT ENCOUNTER NOTE
Sushila    Your lab tests are complete and I have reviewed the results.     - Your Blood Count Results show normal White Blood Cell count (no sign of infection),   - LOW Hemoglobin (anemia) - which continues to improve  - normal Platelets (affects clotting).  - You will receive your Pap Smear results in a separate letter.    If you have any questions or concerns, please feel free to call or send a G-mode message.    Sincerely,  Bhavin Payne PA-C

## 2022-11-29 LAB
HUMAN PAPILLOMA VIRUS 16 DNA: NEGATIVE
HUMAN PAPILLOMA VIRUS 18 DNA: NEGATIVE
HUMAN PAPILLOMA VIRUS FINAL DIAGNOSIS: NORMAL
HUMAN PAPILLOMA VIRUS OTHER HR: NEGATIVE

## 2023-02-02 ENCOUNTER — TRANSFERRED RECORDS (OUTPATIENT)
Dept: HEALTH INFORMATION MANAGEMENT | Facility: CLINIC | Age: 43
End: 2023-02-02

## 2023-02-02 PROCEDURE — 88305 TISSUE EXAM BY PATHOLOGIST: CPT | Mod: TC,ORL | Performed by: INTERNAL MEDICINE

## 2023-02-02 PROCEDURE — 88305 TISSUE EXAM BY PATHOLOGIST: CPT | Mod: 26 | Performed by: PATHOLOGY

## 2023-02-03 ENCOUNTER — LAB REQUISITION (OUTPATIENT)
Dept: LAB | Facility: CLINIC | Age: 43
End: 2023-02-03
Payer: COMMERCIAL

## 2023-02-03 DIAGNOSIS — K22.89 OTHER SPECIFIED DISEASE OF ESOPHAGUS: ICD-10-CM

## 2023-02-03 DIAGNOSIS — K22.10 ULCER OF ESOPHAGUS WITHOUT BLEEDING: ICD-10-CM

## 2023-02-24 ENCOUNTER — OFFICE VISIT (OUTPATIENT)
Dept: ALLERGY | Facility: CLINIC | Age: 43
End: 2023-02-24
Attending: PHYSICIAN ASSISTANT
Payer: COMMERCIAL

## 2023-02-24 VITALS
OXYGEN SATURATION: 99 % | WEIGHT: 197.9 LBS | DIASTOLIC BLOOD PRESSURE: 94 MMHG | BODY MASS INDEX: 32.93 KG/M2 | HEART RATE: 82 BPM | SYSTOLIC BLOOD PRESSURE: 138 MMHG

## 2023-02-24 DIAGNOSIS — Z91.09 ENVIRONMENTAL ALLERGIES: ICD-10-CM

## 2023-02-24 DIAGNOSIS — J34.89 SINUS PRESSURE: ICD-10-CM

## 2023-02-24 DIAGNOSIS — R68.2 DRY MOUTH: ICD-10-CM

## 2023-02-24 DIAGNOSIS — K22.0 ACHALASIA: Primary | ICD-10-CM

## 2023-02-24 DIAGNOSIS — H04.123 DRY EYES: ICD-10-CM

## 2023-02-24 DIAGNOSIS — R09.89 RUNNY NOSE: ICD-10-CM

## 2023-02-24 PROCEDURE — 99204 OFFICE O/P NEW MOD 45 MIN: CPT | Mod: 25 | Performed by: INTERNAL MEDICINE

## 2023-02-24 PROCEDURE — 95004 PERQ TESTS W/ALRGNC XTRCS: CPT | Performed by: INTERNAL MEDICINE

## 2023-02-24 RX ORDER — SUCRALFATE 1 G/1
TABLET ORAL
COMMUNITY
Start: 2023-02-02 | End: 2024-05-29

## 2023-02-24 RX ORDER — LORATADINE 10 MG/1
10 TABLET ORAL DAILY
COMMUNITY
End: 2024-05-29

## 2023-02-24 ASSESSMENT — ENCOUNTER SYMPTOMS
EYE DISCHARGE: 0
COUGH: 0
EYE REDNESS: 0
FEVER: 0
HEADACHES: 0
WHEEZING: 0
NAUSEA: 0
ARTHRALGIAS: 0
DIARRHEA: 0
RHINORRHEA: 0
CHILLS: 0
VOMITING: 0
MYALGIAS: 0
ADENOPATHY: 0
ACTIVITY CHANGE: 0
CHEST TIGHTNESS: 0
FACIAL SWELLING: 0
JOINT SWELLING: 0
EYE ITCHING: 0
SHORTNESS OF BREATH: 0
SINUS PRESSURE: 0

## 2023-02-24 NOTE — PROGRESS NOTES
Per provider verbal order, placed Adult Environmental Panel scratch test. Once panels were placed, patient was monitored for 15 minutes in clinic.  Provider read test after 15 minutes.  Pt tolerated procedure well.  All questions and concerns were addressed at office visit.     GERMAINE SchneiderN, RN

## 2023-02-24 NOTE — PATIENT INSTRUCTIONS
Sjogren labs ordered.  These were ordered due to her history of achalasia as well as dry eyes, dry mouth as well as joint pain.  We will put in a referral to rheumatology at the same time.  Skin testing was negative.  Also ordered blood test for a few additional allergens.  Consider switching Claritin to Allegra 180 mg daily which is usually more effective.  Also may try Flonase Sensimist 1 spray each nostril as needed.  We will follow-up as needed          Allergy Staff Appt Hours Shot Hours Location       Physician   Chico Hendrix MD      Support Staff   Nadiya TARIQ, LOIS SCHILLING, RN   Brian TREVINO, HANS HARRIS, MALVIN      Mondays Tuesdays Thursdays and Fridays:  Cecilia 7-5 Wednesdays  Close                Mondays, Tuesdays and Fridays:  7:40 - 3:20              [facility]  [address]  [place] [address]  Appt Line: [phone]    Pulmonary Function Scheduling:  [place]: [phone]           Questions about cost of your care  For questions about your cost of your visit, procedure, lab or imaging contact:  [employer] [place] Consumer Price Line [phone] or visit:  [email or web address]    Important Scheduling Information  Appointments for skin testing: Appointment will last approximately 45 minutes.  Please call the appointment line for your clinic to schedule.  Discontinue oral antihistamines 7 days prior to the appointment.  Discontinue nasal and ocular antihistamines 4 days prior to appointment.    Appointments for challenges (oral food challenge, penicillin testing, aspirin desensitization) If your provider instructs you to that this additional testing is indicated, it will need to be scheduled directly through the allergy department.  Please contact them via "[employer] or by calling the clinic and asking to speak with the allergy team.  They will provide additional information and instructions for the appointment.  Discontinue oral  antihistamines 7 days prior to the appointment.  Discontinue nasal and ocular antihistamines 4 days prior to the appointment.    Thank you for trusting us with your care. Please feel free to contact us with any questions or concerns you may have.

## 2023-02-24 NOTE — LETTER
2023         RE: Opal Muller  [address]  [place] [address]        Dear Colleague,    Thank you for referring your patient, Opal Muller, to the [facility] [place]. Please see a copy of my visit note below.    Opal Muller was seen in the Allergy Clinic at [facility].    Opal Muller is a 42 year old female being seen today at the request of Kaley Payne PA-C in consultation for Environmental allergies.    She has symptoms of a rhinorrhea, headaches, sinus pressure as well as itchy eyes and itchy skin.  She has seasonal symptoms, especially when she is around leaves that are wet in the spring and the fall.  Claritin as needed provides partial benefit.  She works at [employer] and cinnamon scented pine cones also seem to cause problems with facial itching and clearing of her throat.  Cinnamon scented candles also cause problems for her.  She has tried Flonase but this flowed down her throat so she stopped using Flonase.    She also has a history of achalasia and has had a surgery with gastroenterology.  She also has dry eyes and dry mouth and joint pain and would like to see if Sjogren's labs could be ordered.  She has not seen rheumatology.      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      Family History   Problem Relation Age of Onset     Multiple Sclerosis Mother      Hypertension Father      Hyperlipidemia Father      Eye Disorder Father      Aneurysm Father         thoracic     Allergies Father      Skin Cancer Maternal Grandmother         severe burn injuries, likely Marjolin's ulcer     Past Surgical History:   Procedure Laterality Date     C/SECTION, LOW TRANSVERSE           DAVINCI NISSEN FUNDOPLICATION N/A 2022    Procedure: ROBOT ASSISTED LAPAROSCOPIC HELLER MYOTOMY WITH PARAESOPHAGEAL HIATAL HERNIA REPAIR AND PARTIAL FUNDOPLICATION/ TOUPET  FUNDOPLICATION WITH EGD;  Surgeon: Francis Ferguson MD;  Location:  OR     ESOPHAGOSCOPY, GASTROSCOPY, DUODENOSCOPY (EGD), COMBINED N/A 9/3/2021    Procedure: ESOPHAGOGASTRODUODENOSCOPY, WITH BIOPSY;  Surgeon: Mary Rodriguez MD;  Location:  GI     ESOPHAGOSCOPY, GASTROSCOPY, DUODENOSCOPY (EGD), COMBINED N/A 2022    Procedure: Esophagoscopy, gastroscopy, duodenoscopy (EGD), combined;  Surgeon: Francis Ferguson MD;  Location:  OR     TONSILLECTOMY      childhood     TUBAL LIGATION Bilateral     after last  section       ENVIRONMENTAL HISTORY:   Pets inside the house include 2 dog(s).  Do you smoke cigarettes or other recreational drugs? No There is/are 0 smokers living in the house. The house does not have a damp basement.     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      Review of Systems   Constitutional: Negative for activity change, chills and fever.   HENT: Negative for congestion, dental problem, ear pain, facial swelling, nosebleeds, postnasal drip, rhinorrhea, sinus pressure and sneezing.    Eyes: Negative for discharge, redness and itching.        Positive for dry eyes    Respiratory: Negative for cough, chest tightness, shortness of breath and wheezing.    Cardiovascular: Negative for chest pain.   Gastrointestinal: Negative for diarrhea, nausea and vomiting.   Musculoskeletal: Negative for arthralgias, joint swelling and myalgias.   Skin: Negative for rash.   Neurological: Negative for headaches.   Hematological: Negative for adenopathy.   Psychiatric/Behavioral: Negative for behavioral problems and self-injury.   All other systems reviewed and are negative.        Current Outpatient Medications:      childrens multivitamin w/iron (FLINTSTONES COMPLETE) 18 MG chewable tablet, Take 1 chew tab by mouth every evening, Disp: , Rfl:      loratadine (CLARITIN) 10 MG tablet, Take 10 mg by mouth daily PRN, Disp: , Rfl:      pantoprazole (PROTONIX) 40 MG EC  tablet, Take 1 tablet (40 mg) by mouth daily, Disp: 30 tablet, Rfl: 2     sucralfate (CARAFATE) 1 GM tablet, , Disp: , Rfl:      acetaminophen (TYLENOL) 500 MG tablet, Take 1-2 tablets (500-1,000 mg) by mouth every 6 hours as needed for mild pain (Take as needed for mild pain), Disp: 30 tablet, Rfl: 1     ondansetron (ZOFRAN ODT) 4 MG ODT tab, Take 1 tablet (4 mg) by mouth every 6 hours as needed for nausea or vomiting (as needed for nausea or vomiting after surgery), Disp: 25 tablet, Rfl: 1  Allergies   Allergen Reactions     Seasonal Allergies          EXAM:   BP (!) 138/94   Pulse 82   Wt 89.8 kg (197 lb 14.4 oz)   SpO2 99%   BMI 32.93 kg/m      Physical Exam    Constitutional:       General: She is not in acute distress.     Appearance: Normal appearance. She is not ill-appearing.   HENT:      Head: Normocephalic and atraumatic.      Nose: Nose normal. No congestion or rhinorrhea.      Mouth/Throat:      Mouth: Mucous membranes are moist.      Pharynx: Oropharynx is clear. No posterior oropharyngeal erythema.   Eyes:      General:         Right eye: No discharge.         Left eye: No discharge.   Cardiovascular:      Rate and Rhythm: Normal rate and regular rhythm.      Heart sounds: Normal heart sounds.   Pulmonary:      Effort: Pulmonary effort is normal.      Breath sounds: Normal breath sounds. No wheezing or rhonchi.   Skin:     General: Skin is warm.      Findings: No erythema or rash.   Neurological:      General: No focal deficit present.      Mental Status: She is alert. Mental status is at baseline.   Psychiatric:         Mood and Affect: Mood normal.         Behavior: Behavior normal.     WORKUP: Skin testing was negative    ENVIRONMENTAL PERCUTANEOUS SKIN TESTING: ADULT  [facility] [facility] 2/24/2023   Consent Y   Ordering Physician Dr. Hendrix   Interpreting Physician Dr. Hendrix   Testing Technician Nadiya JUAN RN   Location Back   Time start:  2:01 PM   Time End:  2:16 PM   Positive Control:  Histatrol*ALK 1 mg/ml 3/8   Negative Control: 50% Glycerin 0   Cat Hair*ALK (10,000 BAU/ml) 0   AP Dog Hair/Dander (1:100 w/v) 0   Dust Mite p. 30,000 AU/ml 0   Dust Mite f. (30,000 AU/ml) 0   Geoffrey (W/F in millimeters) 0   Renard Grass (100,000 BAU/mL) 0   Red Cedar (W/F in millimeters) 0   Maple/Arlington (W/F in millimeters) 0   Hackberry (W/F in millimeters) 0   Merritt (W/F in millimeters) 0   Cadet *ALK (W/F in millimeters) 0   American Elm (W/F in millimeters) 0   Grand (W/F in millimeters) 0   Black Blunt (W/F in millimeters) 0   Birch Mix (W/F in millimeters) 0   Rixford (W/F in millimeters) 0   Oak (W/F in millimeters) 0   Cocklebur (W/F in millimeters) 0   Tucson (W/F in millimeters) 0   White Dez (W/F in millimeters) 0   Careless (W/F in millimeters) 0   Nettle (W/F in millimeters) 0   English Plantain (W/F in millimeters) 0   Kochia (W/F in millimeters) 0   Lamb's Quarter (W/F in millimeters) 0   Marshelder (W/F in millimeters) 0   Ragweed Mix* ALK (W/F in millimeters) 0   Russian Thistle (W/F in millimeters) 0   Sagebrush/Mugwort (W/F in millimeters) 0   Sheep Sorrel (W/F in millimeters) 0   Feather Mix* ALK (W/F in millimeters) 0   Penicillium Mix (1:10 w/v) 0   Curvularia spicifera (1:10 w/v) 0   Epicoccum (1:10 w/v) 0   Aspergillus fumigatus (1:10 w/v): 0   Alternaria tenius (1:10 w/v) 0   H. Cladosporium (1:10 w/v) 0   Phoma herbarum (1:10 w/v) 0        ASSESSMENT/PLAN:  Opal Muller is a 42 year old female seen today for environmental allergy concerns.  Skin testing was negative.    Has concerns for Sjogren's syndrome and does have a history of achalasia with dry eyes and dry mouth.  SSA and SSB Antibodies were ordered and a referral to rheumatology was placed.  If the labs are normal I feel it would make sense for her to still be evaluated.    1. Sjogren labs ordered.  These were ordered due to her history of achalasia as well as dry eyes, dry mouth as well as joint pain.  We  will put in a referral to rheumatology at the same time.  2. Skin testing was negative.  Also ordered blood test for a few additional allergens.  Consider switching Claritin to Allegra 180 mg daily which is usually more effective.  Also may try Flonase Sensimist 1 spray each nostril as needed.  3. We will follow-up as needed    Thank you for allowing me to participate in the care of Opal Muller.      I spent 30 minutes on the date of the encounter doing chart review, history and exam, documentation and further coordination as noted above exclusive of separately reported interpretations    Chico Hendrix MD  Allergy/Immunology  [facility]      Per provider verbal order, placed Adult Environmental Panel scratch test. Once panels were placed, patient was monitored for 15 minutes in clinic.  Provider read test after 15 minutes.  Pt tolerated procedure well.  All questions and concerns were addressed at office visit.     GERMAINE SchneiderN, RN                Again, thank you for allowing me to participate in the care of your patient.        Sincerely,        Chico Hendrix MD

## 2023-02-24 NOTE — PROGRESS NOTES
Opal Muller was seen in the Allergy Clinic at [facility].    Opal Muller is a 42 year old female being seen today at the request of Kaley Payne PA-C in consultation for Environmental allergies.    She has symptoms of a rhinorrhea, headaches, sinus pressure as well as itchy eyes and itchy skin.  She has seasonal symptoms, especially when she is around leaves that are wet in the spring and the fall.  Claritin as needed provides partial benefit.  She works at [employer] and cinnamon scented pine cones also seem to cause problems with facial itching and clearing of her throat.  Cinnamon scented candles also cause problems for her.  She has tried Flonase but this flowed down her throat so she stopped using Flonase.    She also has a history of achalasia and has had a surgery with gastroenterology.  She also has dry eyes and dry mouth and joint pain and would like to see if Sjogren's labs could be ordered.  She has not seen rheumatology.      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      Family History   Problem Relation Age of Onset     Multiple Sclerosis Mother      Hypertension Father      Hyperlipidemia Father      Eye Disorder Father      Aneurysm Father         thoracic     Allergies Father      Skin Cancer Maternal Grandmother         severe burn injuries, likely Marjolin's ulcer     Past Surgical History:   Procedure Laterality Date     C/SECTION, LOW TRANSVERSE           DAVINCI NISSEN FUNDOPLICATION N/A 2022    Procedure: ROBOT ASSISTED LAPAROSCOPIC HELLER MYOTOMY WITH PARAESOPHAGEAL HIATAL HERNIA REPAIR AND PARTIAL FUNDOPLICATION/ TOUPET FUNDOPLICATION WITH EGD;  Surgeon: Francis Ferguson MD;  Location:  OR     ESOPHAGOSCOPY, GASTROSCOPY, DUODENOSCOPY (EGD), COMBINED N/A 9/3/2021    Procedure: ESOPHAGOGASTRODUODENOSCOPY, WITH BIOPSY;  Surgeon: Mary Rodriguez MD;  Location:  GI     ESOPHAGOSCOPY,  GASTROSCOPY, DUODENOSCOPY (EGD), COMBINED N/A 2022    Procedure: Esophagoscopy, gastroscopy, duodenoscopy (EGD), combined;  Surgeon: Francis Ferguson MD;  Location: [facility] OR     TONSILLECTOMY      childhood     TUBAL LIGATION Bilateral     after last  section       ENVIRONMENTAL HISTORY:   Pets inside the house include 2 dog(s).  Do you smoke cigarettes or other recreational drugs? No There is/are 0 smokers living in the house. The house does not have a damp basement.     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      Review of Systems   Constitutional: Negative for activity change, chills and fever.   HENT: Negative for congestion, dental problem, ear pain, facial swelling, nosebleeds, postnasal drip, rhinorrhea, sinus pressure and sneezing.    Eyes: Negative for discharge, redness and itching.        Positive for dry eyes    Respiratory: Negative for cough, chest tightness, shortness of breath and wheezing.    Cardiovascular: Negative for chest pain.   Gastrointestinal: Negative for diarrhea, nausea and vomiting.   Musculoskeletal: Negative for arthralgias, joint swelling and myalgias.   Skin: Negative for rash.   Neurological: Negative for headaches.   Hematological: Negative for adenopathy.   Psychiatric/Behavioral: Negative for behavioral problems and self-injury.   All other systems reviewed and are negative.        Current Outpatient Medications:      childrens multivitamin w/iron (FLINTSTONES COMPLETE) 18 MG chewable tablet, Take 1 chew tab by mouth every evening, Disp: , Rfl:      loratadine (CLARITIN) 10 MG tablet, Take 10 mg by mouth daily PRN, Disp: , Rfl:      pantoprazole (PROTONIX) 40 MG EC tablet, Take 1 tablet (40 mg) by mouth daily, Disp: 30 tablet, Rfl: 2     sucralfate (CARAFATE) 1 GM tablet, , Disp: , Rfl:      acetaminophen (TYLENOL) 500 MG tablet, Take 1-2 tablets (500-1,000 mg) by mouth every 6 hours as needed for mild pain (Take as needed for mild  pain), Disp: 30 tablet, Rfl: 1     ondansetron (ZOFRAN ODT) 4 MG ODT tab, Take 1 tablet (4 mg) by mouth every 6 hours as needed for nausea or vomiting (as needed for nausea or vomiting after surgery), Disp: 25 tablet, Rfl: 1  Allergies   Allergen Reactions     Seasonal Allergies          EXAM:   BP (!) 138/94   Pulse 82   Wt 89.8 kg (197 lb 14.4 oz)   SpO2 99%   BMI 32.93 kg/m      Physical Exam    Constitutional:       General: She is not in acute distress.     Appearance: Normal appearance. She is not ill-appearing.   HENT:      Head: Normocephalic and atraumatic.      Nose: Nose normal. No congestion or rhinorrhea.      Mouth/Throat:      Mouth: Mucous membranes are moist.      Pharynx: Oropharynx is clear. No posterior oropharyngeal erythema.   Eyes:      General:         Right eye: No discharge.         Left eye: No discharge.   Cardiovascular:      Rate and Rhythm: Normal rate and regular rhythm.      Heart sounds: Normal heart sounds.   Pulmonary:      Effort: Pulmonary effort is normal.      Breath sounds: Normal breath sounds. No wheezing or rhonchi.   Skin:     General: Skin is warm.      Findings: No erythema or rash.   Neurological:      General: No focal deficit present.      Mental Status: She is alert. Mental status is at baseline.   Psychiatric:         Mood and Affect: Mood normal.         Behavior: Behavior normal.     WORKUP: Skin testing was negative    ENVIRONMENTAL PERCUTANEOUS SKIN TESTING: ADULT  [place] [facility] 2/24/2023   Consent Y   Ordering Physician Dr. Hendrix   Interpreting Physician Dr. Hendirx   Testing Technician Nadiya JUAN RN   Location Back   Time start:  2:01 PM   Time End:  2:16 PM   Positive Control: Histatrol*ALK 1 mg/ml 3/8   Negative Control: 50% Glycerin 0   Cat Hair*ALK (10,000 BAU/ml) 0   AP Dog Hair/Dander (1:100 w/v) 0   Dust Mite p. 30,000 AU/ml 0   Dust Mite f. (30,000 AU/ml) 0   Geoffrey (W/F in millimeters) 0   Renard Grass (100,000 BAU/mL) 0   Red Cedar (W/F in  millimeters) 0   Maple/Equality (W/F in millimeters) 0   Hackberry (W/F in millimeters) 0   [place] (W/F in millimeters) 0   [place] *ALK (W/F in millimeters) 0   American Elm (W/F in millimeters) 0   [place] (W/F in millimeters) 0   Black [place] (W/F in millimeters) 0   Birch Mix (W/F in millimeters) 0   [place]ney (W/F in millimeters) 0   Oak (W/F in millimeters) 0   Cocklebur (W/F in millimeters) 0   [place] (W/F in millimeters) 0   White Dez (W/F in millimeters) 0   Careless (W/F in millimeters) 0   Nettle (W/F in millimeters) 0   English Plantain (W/F in millimeters) 0   Kochia (W/F in millimeters) 0   Lamb's Quarter (W/F in millimeters) 0   Marshelder (W/F in millimeters) 0   Ragweed Mix* ALK (W/F in millimeters) 0   Russian Thistle (W/F in millimeters) 0   Sagebrush/Mugwort (W/F in millimeters) 0   Sheep Sorrel (W/F in millimeters) 0   Feather Mix* ALK (W/F in millimeters) 0   Penicillium Mix (1:10 w/v) 0   Curvularia spicifera (1:10 w/v) 0   Epicoccum (1:10 w/v) 0   Aspergillus fumigatus (1:10 w/v): 0   Alternaria tenius (1:10 w/v) 0   H. Cladosporium (1:10 w/v) 0   Phoma herbarum (1:10 w/v) 0        ASSESSMENT/PLAN:  Opal Muller is a 42 year old female seen today for environmental allergy concerns.  Skin testing was negative.    Has concerns for Sjogren's syndrome and does have a history of achalasia with dry eyes and dry mouth.  SSA and SSB Antibodies were ordered and a referral to rheumatology was placed.  If the labs are normal I feel it would make sense for her to still be evaluated.    1. Sjogren labs ordered.  These were ordered due to her history of achalasia as well as dry eyes, dry mouth as well as joint pain.  We will put in a referral to rheumatology at the same time.  2. Skin testing was negative.  Also ordered blood test for a few additional allergens.  Consider switching Claritin to Allegra 180 mg daily which is usually more effective.  Also may try Flonase Sensimist 1 spray each  nostril as needed.  3. We will follow-up as needed    Thank you for allowing me to participate in the care of Opal Muller.      I spent 30 minutes on the date of the encounter doing chart review, history and exam, documentation and further coordination as noted above exclusive of separately reported interpretations    Chico Hendrix MD  Allergy/Immunology  [facility]

## 2023-03-01 ENCOUNTER — LAB (OUTPATIENT)
Dept: LAB | Facility: CLINIC | Age: 43
End: 2023-03-01
Payer: COMMERCIAL

## 2023-03-01 DIAGNOSIS — J34.89 SINUS PRESSURE: ICD-10-CM

## 2023-03-01 DIAGNOSIS — H04.123 DRY EYES: ICD-10-CM

## 2023-03-01 DIAGNOSIS — K22.0 ACHALASIA: ICD-10-CM

## 2023-03-01 DIAGNOSIS — R09.89 RUNNY NOSE: ICD-10-CM

## 2023-03-01 PROCEDURE — 36415 COLL VENOUS BLD VENIPUNCTURE: CPT

## 2023-03-01 PROCEDURE — 86003 ALLG SPEC IGE CRUDE XTRC EA: CPT

## 2023-03-01 PROCEDURE — 86235 NUCLEAR ANTIGEN ANTIBODY: CPT

## 2023-03-01 PROCEDURE — 86235 NUCLEAR ANTIGEN ANTIBODY: CPT | Mod: 59

## 2023-03-02 LAB
A ALTERNATA IGE QN: <0.1 KU(A)/L
A FUMIGATUS IGE QN: <0.1 KU(A)/L
C HERBARUM IGE QN: <0.1 KU(A)/L
COMMON RAGWEED IGE QN: <0.1 KU(A)/L
D FARINAE IGE QN: <0.1 KU(A)/L
D PTERONYSS IGE QN: <0.1 KU(A)/L
DOG DANDER+EPITH IGE QN: <0.1 KU(A)/L
E PURPURASCENS IGE QN: <0.1 KU(A)/L
ENA SS-A AB SER IA-ACNC: <0.5 U/ML
ENA SS-A AB SER IA-ACNC: NEGATIVE
ENA SS-B IGG SER IA-ACNC: <0.6 U/ML
ENA SS-B IGG SER IA-ACNC: NEGATIVE
GIANT RAGWEED IGE QN: <0.1 KU(A)/L
P NOTATUM IGE QN: <0.1 KU(A)/L
SILVER BIRCH IGE QN: <0.1 KU(A)/L

## 2023-04-13 ENCOUNTER — TRANSFERRED RECORDS (OUTPATIENT)
Dept: HEALTH INFORMATION MANAGEMENT | Facility: CLINIC | Age: 43
End: 2023-04-13

## 2023-04-13 PROCEDURE — 88305 TISSUE EXAM BY PATHOLOGIST: CPT | Mod: TC,ORL | Performed by: INTERNAL MEDICINE

## 2023-04-13 PROCEDURE — 88342 IMHCHEM/IMCYTCHM 1ST ANTB: CPT | Mod: 26 | Performed by: PATHOLOGY

## 2023-04-13 PROCEDURE — 88305 TISSUE EXAM BY PATHOLOGIST: CPT | Mod: 26 | Performed by: PATHOLOGY

## 2023-04-14 ENCOUNTER — LAB REQUISITION (OUTPATIENT)
Dept: LAB | Facility: CLINIC | Age: 43
End: 2023-04-14
Payer: COMMERCIAL

## 2023-04-14 DIAGNOSIS — K22.70 BARRETT'S ESOPHAGUS WITHOUT DYSPLASIA: ICD-10-CM

## 2023-04-14 DIAGNOSIS — K28.3 ACUTE GASTROJEJUNAL ULCER WITHOUT HEMORRHAGE OR PERFORATION: ICD-10-CM

## 2023-04-14 DIAGNOSIS — Z98.890 OTHER SPECIFIED POSTPROCEDURAL STATES: ICD-10-CM

## 2023-04-14 DIAGNOSIS — R13.10 DYSPHAGIA, UNSPECIFIED: ICD-10-CM

## 2023-04-14 DIAGNOSIS — K29.70 GASTRITIS, UNSPECIFIED, WITHOUT BLEEDING: ICD-10-CM

## 2023-04-18 LAB
PATH REPORT.ADDENDUM SPEC: NORMAL
PATH REPORT.COMMENTS IMP SPEC: NORMAL
PATH REPORT.COMMENTS IMP SPEC: NORMAL
PATH REPORT.FINAL DX SPEC: NORMAL
PATH REPORT.GROSS SPEC: NORMAL
PATH REPORT.MICROSCOPIC SPEC OTHER STN: NORMAL
PATH REPORT.RELEVANT HX SPEC: NORMAL
PHOTO IMAGE: NORMAL

## 2023-07-14 ENCOUNTER — ANCILLARY PROCEDURE (OUTPATIENT)
Dept: GENERAL RADIOLOGY | Facility: CLINIC | Age: 43
End: 2023-07-14
Attending: FAMILY MEDICINE
Payer: COMMERCIAL

## 2023-07-14 ENCOUNTER — OFFICE VISIT (OUTPATIENT)
Dept: URGENT CARE | Facility: URGENT CARE | Age: 43
End: 2023-07-14
Payer: COMMERCIAL

## 2023-07-14 VITALS
TEMPERATURE: 97.3 F | HEART RATE: 80 BPM | SYSTOLIC BLOOD PRESSURE: 135 MMHG | BODY MASS INDEX: 31.62 KG/M2 | DIASTOLIC BLOOD PRESSURE: 95 MMHG | OXYGEN SATURATION: 100 % | RESPIRATION RATE: 18 BRPM | WEIGHT: 190 LBS

## 2023-07-14 DIAGNOSIS — M54.50 RIGHT-SIDED LOW BACK PAIN WITHOUT SCIATICA, UNSPECIFIED CHRONICITY: ICD-10-CM

## 2023-07-14 DIAGNOSIS — M53.3 SACROILIAC JOINT PAIN: ICD-10-CM

## 2023-07-14 DIAGNOSIS — M54.50 RIGHT-SIDED LOW BACK PAIN WITHOUT SCIATICA, UNSPECIFIED CHRONICITY: Primary | ICD-10-CM

## 2023-07-14 PROCEDURE — 72100 X-RAY EXAM L-S SPINE 2/3 VWS: CPT | Mod: TC | Performed by: RADIOLOGY

## 2023-07-14 PROCEDURE — 99214 OFFICE O/P EST MOD 30 MIN: CPT | Performed by: FAMILY MEDICINE

## 2023-07-14 RX ORDER — CYCLOBENZAPRINE HCL 10 MG
10 TABLET ORAL 3 TIMES DAILY PRN
Qty: 15 TABLET | Refills: 0 | Status: SHIPPED | OUTPATIENT
Start: 2023-07-14 | End: 2024-05-29

## 2023-07-14 RX ORDER — DEXTROAMPHETAMINE SACCHARATE, AMPHETAMINE ASPARTATE MONOHYDRATE, DEXTROAMPHETAMINE SULFATE AND AMPHETAMINE SULFATE 6.25; 6.25; 6.25; 6.25 MG/1; MG/1; MG/1; MG/1
CAPSULE, EXTENDED RELEASE ORAL EVERY MORNING
COMMUNITY
End: 2023-07-17

## 2023-07-14 NOTE — PATIENT INSTRUCTIONS
I think you are having pain in the SI joint(sacroiliac joint)    I sent in some muscle relaxants and referred you to physical therapy for more evaluation.

## 2023-07-14 NOTE — PROGRESS NOTES
Assessment & Plan     Right-sided low back pain without sciatica, unspecified chronicity  Xray on my read okay  - XR Lumbar Spine 2/3 Views  - Physical Therapy Referral    Sacroiliac joint pain  I think this is SI joint pain, refer to PT and muscle relaxants.  - Physical Therapy Referral  - cyclobenzaprine (FLEXERIL) 10 MG tablet  Dispense: 15 tablet; Refill: 0             No follow-ups on file.    Joe Parker MD  [facility] [place]    Yun Conti is a 42 year old female who presents to clinic today for the following health issues:  Chief Complaint   Patient presents with     Urgent Care     Back Pain     Lower rt back pain and hip pain as well on and off for a while may be related to injury lifting at work.      HPI    Here with concern about back pain.  Back and groin pain. Sometimes with walking it hurts.  Not able to get comfortable.  States last 2 days worse.  advil today. Tylenol as well.    No numbness in legs.  No bowel or bladder loss control.  No numbness or injury in anus/pubic area.        Review of Systems        Objective    BP (!) 135/95   Pulse 80   Temp 97.3  F (36.3  C) (Temporal)   Resp 18   Wt 86.2 kg (190 lb)   SpO2 100%   BMI 31.62 kg/m    Physical Exam  Vitals and nursing note reviewed.   Constitutional:       Appearance: Normal appearance.   Cardiovascular:      Rate and Rhythm: Normal rate and regular rhythm.      Pulses: Normal pulses.      Heart sounds: Normal heart sounds.   Pulmonary:      Effort: Pulmonary effort is normal.      Breath sounds: Normal breath sounds.   Musculoskeletal:      Comments: SI joint pain on palpation  Distal strength/sensation intact  Straight leg raise neg   Neurological:      Mental Status: She is alert.

## 2023-07-15 ENCOUNTER — NURSE TRIAGE (OUTPATIENT)
Dept: NURSING | Facility: CLINIC | Age: 43
End: 2023-07-15
Payer: COMMERCIAL

## 2023-07-15 DIAGNOSIS — M53.3 SI (SACROILIAC) JOINT DYSFUNCTION: Primary | ICD-10-CM

## 2023-07-15 NOTE — TELEPHONE ENCOUNTER
S: Lower back pain.  B: Was at urgent care on 7/14.  Was prescribed Flexeril. Patient states the flexeril makes her sleepy.  Has been taking Tylenol and using heat to back.Has not been taking NSAID medication.      Patient is interested in getting a Cortizone injection into her spine.    A: Writer will send message to PCP.  Writer advised patient to take Tylenol and NSAID (with food).  Reviewed position in bed with pillows.  Can try ice to lumbar spine.  Could try cutting flexeril in 1/2 to see if this will make you less sleepy.    R: Protocol and care advice reviewed. Patient verbalized understanding, in agreement with plan, and voiced no further questions. Call back with any new or worsening symptoms, concerns, or questions.    Lynne Sargent RN, MA  [facility] Triage Nurse Advisor    Reason for Disposition   [1] Caller requesting NON-URGENT health information AND [2] PCP's office is the best resource   Requesting regular office appointment    Protocols used: Information Only Call-A-

## 2023-07-17 RX ORDER — TIZANIDINE 2 MG/1
2 TABLET ORAL 3 TIMES DAILY PRN
Qty: 10 TABLET | Refills: 1 | Status: SHIPPED | OUTPATIENT
Start: 2023-07-17 | End: 2024-05-29

## 2023-07-17 RX ORDER — DICLOFENAC SODIUM 75 MG/1
75 TABLET, DELAYED RELEASE ORAL 2 TIMES DAILY WITH MEALS
Qty: 30 TABLET | Refills: 0 | Status: SHIPPED | OUTPATIENT
Start: 2023-07-17 | End: 2024-05-29

## 2023-07-17 NOTE — TELEPHONE ENCOUNTER
Called patient and relayed provider's message. Patient stated understanding and will  the medication from the pharmacy. Also informed patient that if the pain gets worse or if she has any questions/concerns to call the clinic and speak to a triage nurse. Patient agreed with this plan and had no further questions.    Kell SALGADO RN  [facility] Triage Team

## 2023-07-17 NOTE — TELEPHONE ENCOUNTER
UC notes reviewed, exam was positive for SIJ pain, which won't improve with a steroid injection. I recommend she follow-up with PT as referred by UC. Has her pain improved with changes recommended by FNA?

## 2023-07-17 NOTE — TELEPHONE ENCOUNTER
Ok to continue use of back brace, if this is helping. I sent tizanidine over to try for a muscle relaxant, this tends to not be as sedating as cyclobenzaprine. Recommend maximum dose of pain reliever: Tylenol 1000 mg TID, diclofenac 75 mg BID (sent to pharmacy). Alternate heat and ice. OTC topical lidocaine + menthol QID (ex. Salonpas, Aspercreme). Change position frequently -- use small cushion under bottom when sitting, avoid slouching (I.e. better to sit in a desk/dining table chair vs couch), stand with weight distributed evenly between both legs, avoid crossing legs while sitting, place pillow between knees when laying on side, place pillow under knees when laying on back, place pillow under stomach when laying on stomach. I updated the PT referral for our acute back pain program, so patient should be receiving a call to see one of our therapists in 24-48 hours. I cannot speak to work restrictions as I have not examined her; I'm just going off  notes.

## 2023-07-17 NOTE — TELEPHONE ENCOUNTER
Called patient and relayed provider's message. Patient stated that the pain has not improved with the the recommendations from FNA.     Patient has been wearing a back brace and notice some difference in pain when she takes it off (pain increases when taking off). Patient stated that she is not able to take the Flexeril as it makes her too sleepy. Patient stated that she has not tried to cut it in half as when she took the whole tablet it did not help with her pain.    Patient is wondering what else she can do for the pain. Pharmacy is pended if needed. Patient is also wondering if she should have any work restrictions.    Kell SALGADO RN  [facility] Triage Team

## 2023-07-20 ENCOUNTER — THERAPY VISIT (OUTPATIENT)
Dept: PHYSICAL THERAPY | Facility: CLINIC | Age: 43
End: 2023-07-20
Attending: PHYSICIAN ASSISTANT
Payer: COMMERCIAL

## 2023-07-20 DIAGNOSIS — M53.3 SACROILIAC JOINT PAIN: ICD-10-CM

## 2023-07-20 DIAGNOSIS — M54.41 RIGHT-SIDED LOW BACK PAIN WITH RIGHT-SIDED SCIATICA: ICD-10-CM

## 2023-07-20 DIAGNOSIS — M53.3 SI (SACROILIAC) JOINT DYSFUNCTION: ICD-10-CM

## 2023-07-20 DIAGNOSIS — M54.50 RIGHT-SIDED LOW BACK PAIN WITHOUT SCIATICA, UNSPECIFIED CHRONICITY: ICD-10-CM

## 2023-07-20 PROCEDURE — 97161 PT EVAL LOW COMPLEX 20 MIN: CPT | Mod: GP | Performed by: PHYSICAL THERAPIST

## 2023-07-20 PROCEDURE — 97530 THERAPEUTIC ACTIVITIES: CPT | Mod: GP | Performed by: PHYSICAL THERAPIST

## 2023-07-20 PROCEDURE — 97110 THERAPEUTIC EXERCISES: CPT | Mod: GP | Performed by: PHYSICAL THERAPIST

## 2023-07-20 NOTE — PROGRESS NOTES
"PHYSICAL THERAPY EVALUATION      See electronic medical record for Abuse and Falls Screening details.    Subjective       Presenting condition or subjective complaint: Si joint facet joint pain  Date of onset: 07/12/23 (exacerbation)    Relevant medical history:     Dates & types of surgery: 8/2022 heller myotomy, 4/2005 csection/tubal, tonsils and adnoids then 2nd surgery for hemorrhage at 6    Prior diagnostic imaging/testing results: X-ray xray in urgent care - facet arthopathy L4-S1   Prior therapy history for the same diagnosis, illness or injury: No      Prior Level of Function  Transfers: Independent  Ambulation: Independent  ADL: Independent      Living Environment  Social support: With a significant other or spouse   Type of home: House   Stairs to enter the home: Yes 8 Is there a railing: Yes   Ramp: No   Stairs inside the home: Yes 30 Is there a railing: Yes   Help at home: None  Equipment owned:       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 Movement.     Patient reports intermittent LBP  going on for several months, worsening in the last 2-3 weeks.  Patient had exacerbation of right LBP during her workday on 7-12-23, worsened as the day went on.   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  She went to urgent care 7-14-23 due to worsening pain and got orders for PT and flexeril which she was unable to take due to making her sleepy.  Pain has been worsening and at this time is constant central/right LB, right buttock as well as intermittent \"muscle pulling/pain\" right groin and right anterior thigh and more recent onset of intermittent \"numbness\" anterior upper lower leg.  Pain is ranging 1-8/10.  Symptoms increase with sitting 15' and is unable to sit upright (leans to the left),  standing <5', walking <5', stairs up>down, (now needing to do " nonreciprocal), losing up to 1 hour of sleep, bending (difficult to get back up).  She denies pain with bowel movement, no red flag symptoms.  Symptoms decrease with wearing a back brace (wearing at work this week).  She is currently working without restrictions.        PHYSICAL THERAPY EVALUATION    Posture/alignment  Sitting leaning to the left.    Standing posture with slight trunk flexion and trunk shift to the left, decreased weight through right LE.     Gait  Antalgic, decreased weight shift and stance phase right, slight trunk flexed posture as well as left shift.      Lumbar AROM  Flexion:  Hands to knees, pain below right knee  Extension:  25%-increase right LE pain   Right sideglide:  Mod restriction, pain right LE  Left sideglide:  WNL    Lumbar Dermatomes  Decreased sensation to light touch right L5    Lumbar Myotomes  4/5 right PF, rest WNL/symetrical    Reflexes  Quad  0 bilaterally  Achilles 1 bilaterally    Slump test  Right  positive  Left  negative    Repeated movement testing  Symptoms prior to test movemens Pain right LB and right buttock  Correction of sitting posture with lumbar roll - produce right thigh pain  SGIS, left side to wall:  Increase right LE symptoms, produce numbness lower leg/worse, no effect trunk shift or ROM   Flexion/rotation in right sidelying:  Abolish right LE symptoms, 1/10 LB, return of symptoms with return to standing with decreased intensity; repeated for 4 additional minutes with same effect.       Assessment & Plan   CLINICAL IMPRESSIONS  Medical Diagnosis: R LBP without sciatica, SI pain    Treatment Diagnosis: R LBP with right leg pain, decreased lumbar ROM   Impression/Assessment: Patient is a 42 year old female with LB and right LE complaints.  The following significant findings have been identified: Pain, Decreased ROM/flexibility, Inflammation, Impaired gait, Decreased activity tolerance and Impaired posture. These impairments interfere with their ability to  perform self care tasks, work tasks, recreational activities, household chores, household mobility and community mobility as compared to previous level of function.     Clinical Decision Making (Complexity):  Clinical Presentation: Evolving/Changing  Clinical Presentation Rationale: based on medical and personal factors listed in PT evaluation  Clinical Decision Making (Complexity): Low complexity    PLAN OF CARE  Treatment Interventions:  Modalities: Cryotherapy  Interventions: Neuromuscular Re-education, Therapeutic Activity, Therapeutic Exercise, Self-Care/Home Management    Long Term Goals     PT Goal 1  Goal Identifier: Ambulation  Goal Description: Patient will be able to stand/walk upright for 30+ minutes with 1/10 symptoms or less  Rationale: to maximize safety and independence with self cares;to maximize safety and independence within the community;to maximize safety and independence within the home;to maximize safety and independence with performance of ADLs and functional tasks (and work activities)  Target Date: 09/28/23      Frequency of Treatment: 1x/week for 6 weeks, then 2x/month for 2 visits  Duration of Treatment:           Risks and benefits of evaluation/treatment have been explained.   Patient/Family/caregiver agrees with Plan of Care.     Evaluation Time:     PT Eval, Low Complexity Minutes (34553): 25       Signing Clinician: Abby Marion PT

## 2023-07-21 PROBLEM — M54.41 RIGHT-SIDED LOW BACK PAIN WITH RIGHT-SIDED SCIATICA: Status: ACTIVE | Noted: 2023-07-21

## 2023-07-24 ENCOUNTER — THERAPY VISIT (OUTPATIENT)
Dept: PHYSICAL THERAPY | Facility: CLINIC | Age: 43
End: 2023-07-24
Payer: COMMERCIAL

## 2023-07-24 DIAGNOSIS — M54.41 RIGHT-SIDED LOW BACK PAIN WITH RIGHT-SIDED SCIATICA: Primary | ICD-10-CM

## 2023-07-24 PROCEDURE — 97530 THERAPEUTIC ACTIVITIES: CPT | Mod: GP | Performed by: PHYSICAL THERAPIST

## 2023-07-24 PROCEDURE — 97110 THERAPEUTIC EXERCISES: CPT | Mod: GP | Performed by: PHYSICAL THERAPIST

## 2023-08-01 ENCOUNTER — THERAPY VISIT (OUTPATIENT)
Dept: PHYSICAL THERAPY | Facility: CLINIC | Age: 43
End: 2023-08-01
Payer: COMMERCIAL

## 2023-08-01 DIAGNOSIS — M54.41 RIGHT-SIDED LOW BACK PAIN WITH RIGHT-SIDED SCIATICA: Primary | ICD-10-CM

## 2023-08-01 PROCEDURE — 97530 THERAPEUTIC ACTIVITIES: CPT | Mod: GP | Performed by: PHYSICAL THERAPIST

## 2023-08-01 PROCEDURE — 97110 THERAPEUTIC EXERCISES: CPT | Mod: GP | Performed by: PHYSICAL THERAPIST

## 2023-10-27 ENCOUNTER — VIRTUAL VISIT (OUTPATIENT)
Dept: RHEUMATOLOGY | Facility: CLINIC | Age: 43
End: 2023-10-27
Attending: INTERNAL MEDICINE
Payer: COMMERCIAL

## 2023-10-27 VITALS — WEIGHT: 200 LBS | HEIGHT: 66 IN | BODY MASS INDEX: 32.14 KG/M2

## 2023-10-27 DIAGNOSIS — K22.0 ACHALASIA: ICD-10-CM

## 2023-10-27 DIAGNOSIS — H04.123 DRY EYES: ICD-10-CM

## 2023-10-27 DIAGNOSIS — R68.2 DRY MOUTH: ICD-10-CM

## 2023-10-27 PROCEDURE — 99245 OFF/OP CONSLTJ NEW/EST HI 55: CPT | Mod: 95 | Performed by: STUDENT IN AN ORGANIZED HEALTH CARE EDUCATION/TRAINING PROGRAM

## 2023-10-27 RX ORDER — METOCLOPRAMIDE 5 MG/1
5 TABLET ORAL
COMMUNITY
Start: 2023-07-14 | End: 2024-05-29

## 2023-10-27 ASSESSMENT — PAIN SCALES - GENERAL: PAINLEVEL: MODERATE PAIN (5)

## 2023-10-27 NOTE — NURSING NOTE
"Patient is also taking, Tylenol Arthritis as needed.          Is the patient currently in the state of [place]? YES    Visit mode:VIDEO    If the visit is dropped, the patient can be reconnected by: VIDEO VISIT: Text to cell phone:   Telephone Information:   Mobile [phone]       Will anyone else be joining the visit? NO  (If patient encounters technical issues they should call [phone]    How would you like to obtain your AVS? MyChart    Are changes needed to the allergy or medication list? Yes ADD- Tylenol Arthritis as needed.     Reason for visit: Consult (Patient said, \"Has Achalasia, mom has Moms, wants to be checked for some symptoms, Sjogrens neg, sore joints, dry mount, joints in back, when sick gets tingly buzzing in head that last for weeks, dry skin.\")    Kirstin Larios VVF     "

## 2023-10-27 NOTE — PATIENT INSTRUCTIONS
Blood test orders placed    For dry mouth you can try Biotene mouth rinse.     For bilateral arm numbness I recommend you to get EMG.  Discuss with your primary care provider    You can try topical Voltaren gel for pain    Follow-up as needed

## 2023-10-27 NOTE — PROGRESS NOTES
Virtual Visit Details    Type of service:  Video Visit     Video Start time : 11:04 AM   Video stop time :  11: 46 AM     Originating Location (pt. 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    She reports having dry mouth and dry eyes symptoms. Dry eyes seems to be seasonal.  She was also diagnosed with Cavanaugh's esophagus and later achalasia.  She underwent surgery for it and now her symptoms are better.  She gets esophageal spasms sometimes.  Also reports numbness tingling in her arms and hands.  It is worse at night while sleeping.  She has also noticed numbness in her arms while driving.  Occasionally her elbows and knuckles hurt and feel like bruised.  She has occasionally noticed tingling in her head.  She has family history of MS in her mother and cousins.     She also has history of seasonal allergies all her life.  She is allergic to fake cinnamon sent on pine cones and candles at work.  It causes her to have skin itching, throat congestion and eye dryness.  She tries to use mask at work which helps a little.  She has seen allergist and had allergy testing for different environmental allergens which all came back normal.  She uses antiallergy pills.    She reported dry mouth and dry eye symptoms to her allergist and had Sjogren serologies checked which came back negative.  She has noticed stiff neck. No swelling in joints. Feet  gets sore if standing all day. Knees make clicking sound. She had pain in her back and did PT for it and it is better.     No history of psoriasis, ulcerative colitis or chron's disease. No h/o iritis, enthesitis, finger or toe swelling like dactylitis, plantar fascitis or heel pain. Denies any raynauds, malar rash, photosensitivity, recurrent mouth/genital ulcers, pleuritic chest pains, recurrent sinusitis/rhinitis, swallowing difficulty, hearing or visual changes recently. No h/o arterial/venous thrombosis in the past. Denies any tick bite, recent GI/ infection.             Review of Systems:     Review Of Systems  Constitutional: denies fever, chills, night sweats and weight loss.  Skin: No skin rash.  Eyes: + dryness or irritation in eyes. No episode of eye inflammation or redness.   Ears/Nose/Throat: no recurrent sinus infections.  Respiratory: No shortness of breath, dyspnea on exertion, cough, or hemoptysis  Cardiovascular: no chest pain or palpitations.  Gastrointestinal: no nausea, vomiting, abdominal pain.  Normal bowel movements.  Genitourinary: no dysuria, frequency  or hematuria.  Musculoskeletal: as in HPI  Neurologic: + numbness, tingling.  Psychiatric: no mood disorders.  Hematologic/Lymphatic/Immunologic: no history of easy bruising, petechia or purpura.  No abnormal bleeding.   Endocrine: no h/o thyroid disease or Diabetes.                  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      Past Surgical History:   Procedure Laterality Date    C/SECTION, LOW TRANSVERSE          DAVINCI NISSEN FUNDOPLICATION N/A 2022    Procedure: ROBOT ASSISTED LAPAROSCOPIC HELLER MYOTOMY WITH PARAESOPHAGEAL HIATAL HERNIA REPAIR AND PARTIAL FUNDOPLICATION/ TOUPET FUNDOPLICATION WITH EGD;  Surgeon: Francis Ferguson MD;  Location:  OR    ESOPHAGOSCOPY, GASTROSCOPY, DUODENOSCOPY (EGD), COMBINED N/A 9/3/2021    Procedure:  ESOPHAGOGASTRODUODENOSCOPY, WITH BIOPSY;  Surgeon: Mary Rodriguez MD;  Location:  GI    ESOPHAGOSCOPY, GASTROSCOPY, DUODENOSCOPY (EGD), COMBINED N/A 2022    Procedure: Esophagoscopy, gastroscopy, duodenoscopy (EGD), combined;  Surgeon: Francis Ferguson MD;  Location: [facility] OR    TONSILLECTOMY      childhood    TUBAL LIGATION Bilateral     after last  section            Social History:     Social History     Occupational History    Occupation: Repl[employer]   Tobacco Use    Smoking status: Never    Smokeless tobacco: Never   Vaping Use    Vaping Use: Never used   Substance and Sexual Activity    Alcohol use: Not Currently     Comment: occasional glass of wine    Drug use: No    Sexual activity: Yes     Partners: Male     Birth control/protection: Female Surgical            Family History:     Family History   Problem Relation Age of Onset    Multiple Sclerosis Mother     Hypertension Father     Hyperlipidemia Father     Eye Disorder Father     Aneurysm Father         thoracic    Allergies Father     Skin Cancer Maternal Grandmother         severe burn injuries, likely Marjolin's ulcer            Allergies:     Allergies   Allergen Reactions    Seasonal Allergies             Medications:     Current Outpatient Medications   Medication Sig Dispense Refill    loratadine (CLARITIN) 10 MG tablet Take 10 mg by mouth daily PRN      metoclopramide (REGLAN) 5 MG tablet TAKE 1 TABLET EVERY EIGHT HOURS AS NEEDED FOR ESOPHAGEAL SPASMS      childrens multivitamin w/iron (FLINTSTONES COMPLETE) 18 MG chewable tablet Take 1 chew tab by mouth every evening (Patient not taking: Reported on 10/27/2023)      cyclobenzaprine (FLEXERIL) 10 MG tablet Take 1 tablet (10 mg) by mouth 3 times daily as needed for muscle spasms (Patient not taking: Reported on 10/27/2023) 15 tablet 0    diclofenac (VOLTAREN) 75 MG EC tablet Take 1 tablet (75 mg) by mouth 2 times daily (with meals) (Patient not taking: Reported on 10/27/2023) 30  "tablet 0    pantoprazole (PROTONIX) 40 MG EC tablet Take 1 tablet (40 mg) by mouth daily (Patient not taking: Reported on 10/27/2023) 30 tablet 2    sucralfate (CARAFATE) 1 GM tablet  (Patient not taking: Reported on 10/27/2023)      tiZANidine (ZANAFLEX) 2 MG tablet Take 1 tablet (2 mg) by mouth 3 times daily as needed for other (back pain) (Patient not taking: Reported on 10/27/2023) 10 tablet 1            Physical Exam:   Vitals not taken.   Wt Readings from Last 4 Encounters:   10/27/23 90.7 kg (200 lb)   07/14/23 86.2 kg (190 lb)   02/24/23 89.8 kg (197 lb 14.4 oz)   11/22/22 84.8 kg (187 lb)       Constitutional: obese, appearing stated age; cooperative  MS: No swelling present over her joints.  Reports mild tenderness over left third PIP.  No tenderness or synovitis present over bilateral wrists, elbows, ankles, MTP joints.  No knee effusions.  Normal range of motion of shoulders.   Psych: nl judgement, orientation, memory, affect.         Data:     No results found for any visits on 10/27/23.    Recent Labs   Lab Test 11/22/22  0745 08/12/22  0703 08/11/22  2118 08/02/22  0753   WBC 6.8 9.3  --   --    RBC 4.49 4.16  --   --    HGB 10.9* 10.3*  --  10.5*   HCT 35.7 33.0*  --   --    MCV 80 79  --   --    RDW 14.7 15.7*  --   --     213 229  --    BUN  --  8  --   --       No results for input(s): \"TSH\", \"T4\" in the last 72024 hours.  Hemoglobin   Date Value Ref Range Status   11/22/2022 10.9 (L) 11.7 - 15.7 g/dL Final   08/12/2022 10.3 (L) 11.7 - 15.7 g/dL Final   08/02/2022 10.5 (L) 11.7 - 15.7 g/dL Final     Urea Nitrogen   Date Value Ref Range Status   08/12/2022 8 7 - 30 mg/dL Final     Recent Labs   Lab Test 11/22/22  0745 08/12/22  0703 08/11/22 2118 08/02/22  0753   WBC 6.8 9.3  --   --    HGB 10.9* 10.3*  --  10.5*   HCT 35.7 33.0*  --   --    MCV 80 79  --   --     213 229  --    BUN  --  8  --   --        Other studies:   Component      Latest Ref Rng 3/1/2023  1:27 PM   SSA Mary IgG " Instrument Value      <7.0 U/mL <0.5    SSA (Ro) Antibody IgG      Negative  Negative    SSB Mary IgG Instrument Value      <7.0 U/mL <0.6    SSB (La) Antibody IgG      Negative  Negative      Assessment    Bilateral arm and hand numbness tingling  Hand arthralgia  Sicca symptoms  Cavanaugh's esophagus and achalasia  Obesity  Negative SSA/SSB    Skye is 43-year-old female with complaints of sicca symptoms and arm numbness presents for evaluation of possible Sjogren's disease.  Her Sjogren serologies SSA/SSB are negative.  Sicca symptoms could be multifactorial and likely causes include environmental allergies, medications, autoimmune etiologies etc.  She does report history of seasonal allergies all through her life and uses antiallergy medications. She does not have other systemic manifestation of an autoimmune disease including cytopenias, nephritis, skin rashes, inflammatory arthritis etc.  She does report mild hand arthralgia and numbness tingling in her hands radiating up to her arms which seems more neurologic rather than joint centric.  She does report strong family history of MS in her family.  Due to numbness complaints I recommended her to get EMG.  She can discuss with her primary care provider.  She can try wrist splints at night to see if that helps with hand numbness.    To complete work-up for autoimmune connective tissue disease, will get further serologies including RASHIDA, NANDO, rheumatoid factor, anti-CCP, inflammatory markers.  If her serologies comes back normal then no further autoimmune work-up will be required.  She can manage sicca symptoms by using over-the-counter lubricating mouth rinse and eyedrops.    Plan    Blood test orders placed    For dry mouth you can try Biotene mouth rinse.     For bilateral arm numbness I recommend you to get EMG.  Discuss with your primary care provider    You can try topical Voltaren gel for pain    Follow-up as needed    Reviewed Rheumatology lab  flowsheet    Dina Bryant MD  Staff Rheumatologist, [facility]   Division of Rheumatic and Autoimmune diseases   Pager - [phone]

## 2023-10-28 ENCOUNTER — LAB (OUTPATIENT)
Dept: LAB | Facility: CLINIC | Age: 43
End: 2023-10-28
Payer: COMMERCIAL

## 2023-10-28 DIAGNOSIS — R68.2 DRY MOUTH: ICD-10-CM

## 2023-10-28 DIAGNOSIS — K22.0 ACHALASIA: ICD-10-CM

## 2023-10-28 DIAGNOSIS — H04.123 DRY EYES: ICD-10-CM

## 2023-10-28 LAB
ERYTHROCYTE [DISTWIDTH] IN BLOOD BY AUTOMATED COUNT: 15.4 % (ref 10–15)
ERYTHROCYTE [SEDIMENTATION RATE] IN BLOOD BY WESTERGREN METHOD: 10 MM/HR (ref 0–20)
HCT VFR BLD AUTO: 34.1 % (ref 35–47)
HGB BLD-MCNC: 10.5 G/DL (ref 11.7–15.7)
MCH RBC QN AUTO: 24.3 PG (ref 26.5–33)
MCHC RBC AUTO-ENTMCNC: 30.8 G/DL (ref 31.5–36.5)
MCV RBC AUTO: 79 FL (ref 78–100)
PLATELET # BLD AUTO: 290 10E3/UL (ref 150–450)
RBC # BLD AUTO: 4.32 10E6/UL (ref 3.8–5.2)
WBC # BLD AUTO: 8.6 10E3/UL (ref 4–11)

## 2023-10-28 PROCEDURE — 85652 RBC SED RATE AUTOMATED: CPT

## 2023-10-28 PROCEDURE — 86140 C-REACTIVE PROTEIN: CPT

## 2023-10-28 PROCEDURE — 86235 NUCLEAR ANTIGEN ANTIBODY: CPT | Mod: 59

## 2023-10-28 PROCEDURE — 86225 DNA ANTIBODY NATIVE: CPT

## 2023-10-28 PROCEDURE — 86200 CCP ANTIBODY: CPT

## 2023-10-28 PROCEDURE — 80053 COMPREHEN METABOLIC PANEL: CPT

## 2023-10-28 PROCEDURE — 36415 COLL VENOUS BLD VENIPUNCTURE: CPT

## 2023-10-28 PROCEDURE — 86235 NUCLEAR ANTIGEN ANTIBODY: CPT

## 2023-10-28 PROCEDURE — 2894A CENTROMERE ANTIBODY IGG: CPT | Mod: 59

## 2023-10-28 PROCEDURE — 86038 ANTINUCLEAR ANTIBODIES: CPT

## 2023-10-28 PROCEDURE — 86431 RHEUMATOID FACTOR QUANT: CPT

## 2023-10-28 PROCEDURE — 86160 COMPLEMENT ANTIGEN: CPT

## 2023-10-28 PROCEDURE — 85027 COMPLETE CBC AUTOMATED: CPT

## 2023-10-29 LAB
ALBUMIN SERPL BCG-MCNC: 4.4 G/DL (ref 3.5–5.2)
ALP SERPL-CCNC: 68 U/L (ref 35–104)
ALT SERPL W P-5'-P-CCNC: 18 U/L (ref 0–50)
ANION GAP SERPL CALCULATED.3IONS-SCNC: 10 MMOL/L (ref 7–15)
AST SERPL W P-5'-P-CCNC: 21 U/L (ref 0–45)
BILIRUB SERPL-MCNC: <0.2 MG/DL
BUN SERPL-MCNC: 20.4 MG/DL (ref 6–20)
CALCIUM SERPL-MCNC: 9.6 MG/DL (ref 8.6–10)
CHLORIDE SERPL-SCNC: 103 MMOL/L (ref 98–107)
CREAT SERPL-MCNC: 0.7 MG/DL (ref 0.51–0.95)
CRP SERPL-MCNC: 6.77 MG/L
DEPRECATED HCO3 PLAS-SCNC: 24 MMOL/L (ref 22–29)
EGFRCR SERPLBLD CKD-EPI 2021: >90 ML/MIN/1.73M2
GLUCOSE SERPL-MCNC: 77 MG/DL (ref 70–99)
POTASSIUM SERPL-SCNC: 4.5 MMOL/L (ref 3.4–5.3)
PROT SERPL-MCNC: 7.2 G/DL (ref 6.4–8.3)
SODIUM SERPL-SCNC: 137 MMOL/L (ref 135–145)

## 2023-10-30 LAB
C3 SERPL-MCNC: 139 MG/DL (ref 81–157)
C4 SERPL-MCNC: 33 MG/DL (ref 13–39)
CCP AB SER IA-ACNC: 1.3 U/ML
DSDNA AB SER-ACNC: 1.2 IU/ML
ENA SM IGG SER IA-ACNC: 0.7 U/ML
ENA SM IGG SER IA-ACNC: NEGATIVE
ENA SS-A AB SER IA-ACNC: <0.5 U/ML
ENA SS-A AB SER IA-ACNC: NEGATIVE
ENA SS-B IGG SER IA-ACNC: <0.6 U/ML
ENA SS-B IGG SER IA-ACNC: NEGATIVE
RHEUMATOID FACT SER NEPH-ACNC: <6 IU/ML
U1 SNRNP IGG SER IA-ACNC: 1.8 U/ML
U1 SNRNP IGG SER IA-ACNC: NEGATIVE

## 2023-10-31 LAB
ANA SER QL IF: NEGATIVE
CENTROMERE B AB SER-ACNC: <0.7 U/ML
CENTROMERE B AB SER-ACNC: NEGATIVE

## 2023-12-10 PROBLEM — M54.41 RIGHT-SIDED LOW BACK PAIN WITH RIGHT-SIDED SCIATICA: Status: RESOLVED | Noted: 2023-07-21 | Resolved: 2023-12-10

## 2024-01-14 ENCOUNTER — HEALTH MAINTENANCE LETTER (OUTPATIENT)
Age: 44
End: 2024-01-14

## 2024-05-28 SDOH — HEALTH STABILITY: PHYSICAL HEALTH: ON AVERAGE, HOW MANY MINUTES DO YOU ENGAGE IN EXERCISE AT THIS LEVEL?: PATIENT DECLINED

## 2024-05-28 SDOH — HEALTH STABILITY: PHYSICAL HEALTH
ON AVERAGE, HOW MANY DAYS PER WEEK DO YOU ENGAGE IN MODERATE TO STRENUOUS EXERCISE (LIKE A BRISK WALK)?: PATIENT DECLINED

## 2024-05-28 ASSESSMENT — SOCIAL DETERMINANTS OF HEALTH (SDOH): HOW OFTEN DO YOU GET TOGETHER WITH FRIENDS OR RELATIVES?: ONCE A WEEK

## 2024-05-29 ENCOUNTER — OFFICE VISIT (OUTPATIENT)
Dept: FAMILY MEDICINE | Facility: CLINIC | Age: 44
End: 2024-05-29
Payer: COMMERCIAL

## 2024-05-29 ENCOUNTER — ORDERS ONLY (AUTO-RELEASED) (OUTPATIENT)
Dept: FAMILY MEDICINE | Facility: CLINIC | Age: 44
End: 2024-05-29

## 2024-05-29 VITALS
WEIGHT: 220.7 LBS | TEMPERATURE: 97.2 F | SYSTOLIC BLOOD PRESSURE: 129 MMHG | DIASTOLIC BLOOD PRESSURE: 88 MMHG | HEART RATE: 76 BPM | RESPIRATION RATE: 16 BRPM | HEIGHT: 66 IN | OXYGEN SATURATION: 100 % | BODY MASS INDEX: 35.47 KG/M2

## 2024-05-29 DIAGNOSIS — D50.9 MICROCYTIC ANEMIA: ICD-10-CM

## 2024-05-29 DIAGNOSIS — K22.0 ACHALASIA: ICD-10-CM

## 2024-05-29 DIAGNOSIS — Z00.00 ROUTINE GENERAL MEDICAL EXAMINATION AT A HEALTH CARE FACILITY: Primary | ICD-10-CM

## 2024-05-29 DIAGNOSIS — R11.0 NAUSEA: ICD-10-CM

## 2024-05-29 DIAGNOSIS — Z12.11 SCREEN FOR COLON CANCER: ICD-10-CM

## 2024-05-29 DIAGNOSIS — Z12.31 VISIT FOR SCREENING MAMMOGRAM: ICD-10-CM

## 2024-05-29 LAB
ERYTHROCYTE [DISTWIDTH] IN BLOOD BY AUTOMATED COUNT: 15.3 % (ref 10–15)
HCT VFR BLD AUTO: 37.5 % (ref 35–47)
HGB BLD-MCNC: 11.5 G/DL (ref 11.7–15.7)
MCH RBC QN AUTO: 24 PG (ref 26.5–33)
MCHC RBC AUTO-ENTMCNC: 30.7 G/DL (ref 31.5–36.5)
MCV RBC AUTO: 78 FL (ref 78–100)
PLATELET # BLD AUTO: 258 10E3/UL (ref 150–450)
RBC # BLD AUTO: 4.8 10E6/UL (ref 3.8–5.2)
WBC # BLD AUTO: 8.5 10E3/UL (ref 4–11)

## 2024-05-29 PROCEDURE — 80048 BASIC METABOLIC PNL TOTAL CA: CPT | Performed by: PHYSICIAN ASSISTANT

## 2024-05-29 PROCEDURE — 82728 ASSAY OF FERRITIN: CPT | Performed by: PHYSICIAN ASSISTANT

## 2024-05-29 PROCEDURE — 36415 COLL VENOUS BLD VENIPUNCTURE: CPT | Performed by: PHYSICIAN ASSISTANT

## 2024-05-29 PROCEDURE — 80061 LIPID PANEL: CPT | Performed by: PHYSICIAN ASSISTANT

## 2024-05-29 PROCEDURE — 85027 COMPLETE CBC AUTOMATED: CPT | Performed by: PHYSICIAN ASSISTANT

## 2024-05-29 PROCEDURE — 99396 PREV VISIT EST AGE 40-64: CPT | Performed by: PHYSICIAN ASSISTANT

## 2024-05-29 PROCEDURE — 83540 ASSAY OF IRON: CPT | Performed by: PHYSICIAN ASSISTANT

## 2024-05-29 PROCEDURE — 99213 OFFICE O/P EST LOW 20 MIN: CPT | Mod: 25 | Performed by: PHYSICIAN ASSISTANT

## 2024-05-29 PROCEDURE — 83550 IRON BINDING TEST: CPT | Performed by: PHYSICIAN ASSISTANT

## 2024-05-29 RX ORDER — METOCLOPRAMIDE 5 MG/1
5 TABLET ORAL DAILY PRN
Qty: 30 TABLET | Refills: 5 | Status: SHIPPED | OUTPATIENT
Start: 2024-05-29

## 2024-05-29 ASSESSMENT — PAIN SCALES - GENERAL: PAINLEVEL: NO PAIN (0)

## 2024-05-29 NOTE — PATIENT INSTRUCTIONS
"Ferrous fumarate/Ferrett's Iron  Preventive Care Advice   This is general advice we often give to help people stay healthy. Your care team may have specific advice just for you. Please talk to your care team about your own preventive care needs.  Lifestyle  Exercise at least 150 minutes each week (30 minutes a day, 5 days a week).  Do muscle strengthening activities 2 days a week. These help control your weight and prevent disease.  No smoking.  Wear sunscreen to prevent skin cancer.  Have your home tested for radon every 2 to 5 years. Radon is a colorless, odorless gas that can harm your lungs. To learn more, go to www.health.[facility].mn.us and search for \"Radon in Homes.\"  Keep guns unloaded and locked up in a safe place like a safe or gun vault, or, use a gun lock and hide the keys. Always lock away bullets separately. To learn more, visit [place].gov and search for \"safe gun storage.\"  Nutrition  Eat 5 or more servings of fruits and vegetables each day.  Try wheat bread, brown rice and whole grain pasta (instead of white bread, rice, and pasta).  Get enough calcium and vitamin D. Check the label on foods and aim for 100% of the RDA (recommended daily allowance).  Regular exams  Have a dental exam and cleaning every 6 months.  See your health care team every year to talk about:  Any changes in your health.  Any medicines your care team has prescribed.  Preventive care, family planning, and ways to prevent chronic diseases.  Shots (vaccines)   HPV shots (up to age 26), if you've never had them before.  Hepatitis B shots (up to age 59), if you've never had them before.  COVID-19 shot: Get this shot when it's due.  Flu shot: Get a flu shot every year.  Tetanus shot: Get a tetanus shot every 10 years.  Pneumococcal, hepatitis A, and RSV shots: Ask your care team if you need these based on your risk.  Shingles shot (for age 50 and up).  General health tests  Diabetes screening:  Starting at age 35, Get screened for " diabetes at least every 3 years.  If you are younger than age 35, ask your care team if you should be screened for diabetes.  Cholesterol test: At age 39, start having a cholesterol test every 5 years, or more often if advised.  Bone density scan (DEXA): At age 50, ask your care team if you should have this scan for osteoporosis (brittle bones).  Hepatitis C: Get tested at least once in your life.  Abdominal aortic aneurysm screening: Talk to your doctor about having this screening if you:  Have ever smoked; and  Are biologically male; and  Are between the ages of 65 and 75.  STIs (sexually transmitted infections)  Before age 24: Ask your care team if you should be screened for STIs.  After age 24: Get screened for STIs if you're at risk. You are at risk for STIs (including HIV) if:  You are sexually active with more than one person.  You don't use condoms every time.  You or a partner was diagnosed with a sexually transmitted infection.  If you are at risk for HIV, ask about PrEP medicine to prevent HIV.  Get tested for HIV at least once in your life, whether you are at risk for HIV or not.  Cancer screening tests  Cervical cancer screening: If you have a cervix, begin getting regular cervical cancer screening tests at age 21. Most people who have regular screenings with normal results can stop after age 65. Talk about this with your provider.  Breast cancer scan (mammogram): If you've ever had breasts, begin having regular mammograms starting at age 40. This is a scan to check for breast cancer.  Colon cancer screening: It is important to start screening for colon cancer at age 45.  Have a colonoscopy test every 10 years (or more often if you're at risk) Or, ask your provider about stool tests like a FIT test every year or Cologuard test every 3 years.  To learn more about your testing options, visit: www.[place]/599965.pdf.  For help making a decision, visit: marques/tj63708.  Prostate cancer screening test:  If you have a prostate and are age 55 to 69, ask your provider if you would benefit from a yearly prostate cancer screening test.  Lung cancer screening: If you are a current or former smoker age 50 to 80, ask your care team if ongoing lung cancer screenings are right for you.  For informational purposes only. Not to replace the advice of your health care provider. Copyright   2023 [facility]. All rights reserved. Clinically reviewed by the [facility] Transitions Program. [employer] [id number] - REV 04/24.    Learning About Stress  What is stress?     Stress is your body's response to a hard situation. Your body can have a physical, emotional, or mental response. Stress is a fact of life for most people, and it affects everyone differently. What causes stress for you may not be stressful for someone else.  A lot of things can cause stress. You may feel stress when you go on a job interview, take a test, or run a race. This kind of short-term stress is normal and even useful. It can help you if you need to work hard or react quickly. For example, stress can help you finish an important job on time.  Long-term stress is caused by ongoing stressful situations or events. Examples of long-term stress include long-term health problems, ongoing problems at work, or conflicts in your family. Long-term stress can harm your health.  How does stress affect your health?  When you are stressed, your body responds as though you are in danger. It makes hormones that speed up your heart, make you breathe faster, and give you a burst of energy. This is called the fight-or-flight stress response. If the stress is over quickly, your body goes back to normal and no harm is done.  But if stress happens too often or lasts too long, it can have bad effects. Long-term stress can make you more likely to get sick, and it can make symptoms of some diseases worse. If you tense up when you are stressed, you may develop neck,  shoulder, or low back pain. Stress is linked to high blood pressure and heart disease.  Stress also harms your emotional health. It can make you esposito, tense, or depressed. Your relationships may suffer, and you may not do well at work or school.  What can you do to manage stress?  You can try these things to help manage stress:   Do something active. Exercise or activity can help reduce stress. Walking is a great way to get started. Even everyday activities such as housecleaning or yard work can help.  Try yoga or annie chi. These techniques combine exercise and meditation. You may need some training at first to learn them.  Do something you enjoy. For example, listen to music or go to a movie. Practice your hobby or do volunteer work.  Meditate. This can help you relax, because you are not worrying about what happened before or what may happen in the future.  Do guided imagery. Imagine yourself in any setting that helps you feel calm. You can use online videos, books, or a teacher to guide you.  Do breathing exercises. For example:  From a standing position, bend forward from the waist with your knees slightly bent. Let your arms dangle close to the floor.  Breathe in slowly and deeply as you return to a standing position. Roll up slowly and lift your head last.  Hold your breath for just a few seconds in the standing position.  Breathe out slowly and bend forward from the waist.  Let your feelings out. Talk, laugh, cry, and express anger when you need to. Talking with supportive friends or family, a counselor, or a mariah leader about your feelings is a healthy way to relieve stress. Avoid discussing your feelings with people who make you feel worse.  Write. It may help to write about things that are bothering you. This helps you find out how much stress you feel and what is causing it. When you know this, you can find better ways to cope.  What can you do to prevent stress?  You might try some of these things to  "help prevent stress:  Manage your time. This helps you find time to do the things you want and need to do.  Get enough sleep. Your body recovers from the stresses of the day while you are sleeping.  Get support. Your family, friends, and community can make a difference in how you experience stress.  Limit your news feed. Avoid or limit time on social media or news that may make you feel stressed.  Do something active. Exercise or activity can help reduce stress. Walking is a great way to get started.  Where can you learn more?  Go to https://www.[employer].net/patiented  Enter N032 in the search box to learn more about \"Learning About Stress.\"  Current as of: October 24, 2023               Content Version: 14.0    2355-0411 [employer]   Care instructions adapted under license by your healthcare professional. If you have questions about a medical condition or this instruction, always ask your healthcare professional. [employer], [employer] disclaims any warranty or liability for your use of this information.      "

## 2024-05-29 NOTE — PROGRESS NOTES
"Preventive Care Visit  [facility]  Ashlee Morris PA-C, Physician Assistant - Medical  May 29, 2024      Assessment & Plan     Routine general medical examination at a health care facility  - REVIEW OF HEALTH MAINTENANCE PROTOCOL ORDERS  - CBC with platelets  - Basic metabolic panel  - Lipid panel reflex to direct LDL Non-fasting  - CBC with platelets  - Basic metabolic panel  - Lipid panel reflex to direct LDL Non-fasting    Achalasia  Nausea - stable, refills provided  - metoclopramide (REGLAN) 5 MG tablet  Dispense: 30 tablet; Refill: 5    Microcytic anemia - reviewed previous labs, will repeat CBC and check iron tests. Await results.  - Iron and iron binding capacity  - Ferritin  - Iron and iron binding capacity  - Ferritin    Screen for colon cancer  - COLELLIS([employer])    Visit for screening mammogram  - MA Screen Bilateral w/Solo      BMI  Estimated body mass index is 36.17 kg/m  as calculated from the following:    Height as of this encounter: 1.664 m (5' 5.5\").    Weight as of this encounter: 100.1 kg (220 lb 11.2 oz).     Counseling  Appropriate preventive services were discussed with this patient, including applicable screening as appropriate for fall prevention, nutrition, physical activity, Tobacco-use cessation, weight loss and cognition.  Checklist reviewing preventive services available has been given to the patient.  Reviewed patient's diet, addressing concerns and/or questions.   The patient was instructed to see the dentist every 6 months.         Yun Conti is a 43 year old, presenting for the following:  Physical        5/29/2024     1:12 PM   Additional Questions   Roomed by Shyann Iraheta   Accompanied by Perry Conti here today for RHM    Requests refills for reglan for achalasia - follows with GI but they're slow to refill  Recent EGD biopsy results negative for cisneros's    Also requests repeat CBC - has had persistently low hgb but no management " recommended by ordering providers    Health Care Directive  Patient does not have a Health Care Directive or Living Will: Discussed advance care planning with patient; information given to patient to review.    HPI      5/28/2024   General Health   How would you rate your overall physical health? Good   Feel stress (tense, anxious, or unable to sleep) To some extent   (!) STRESS CONCERN      5/28/2024   Nutrition   Three or more servings of calcium each day? (!) I DON'T KNOW   Diet: Regular (no restrictions)   How many servings of fruit and vegetables per day? (!) 2-3   How many sweetened beverages each day? 0-1         5/28/2024   Exercise   Days per week of moderate/strenous exercise Patient declined   Average minutes spent exercising at this level Patient declined         5/28/2024   Social Factors   Frequency of gathering with friends or relatives Once a week   Worry food won't last until get money to buy more No   Food not last or not have enough money for food? No   Do you have housing?  Yes   Are you worried about losing your housing? No   Lack of transportation? No   Unable to get utilities (heat,electricity)? No         5/28/2024   Dental   Dentist two times every year? (!) NO         5/28/2024   TB Screening   Were you born outside of the [place]? No         Today's PHQ-2 Score:       5/28/2024     3:36 PM   PHQ-2 ( 1999 [employer])   Q1: Little interest or pleasure in doing things 0   Q2: Feeling down, depressed or hopeless 1   PHQ-2 Score 1   Q1: Little interest or pleasure in doing things Not at all   Q2: Feeling down, depressed or hopeless Several days   PHQ-2 Score 1           5/28/2024   Substance Use   Alcohol more than 3/day or more than 7/wk No   Do you use any other substances recreationally? No     Social History     Tobacco Use    Smoking status: Never    Smokeless tobacco: Never   Vaping Use    Vaping status: Never Used   Substance Use Topics    Alcohol use: Not Currently     Comment: occasional glass  "of wine    Drug use: Never           11/21/2022   LAST FHS-7 RESULTS   1st degree relative breast or ovarian cancer No   Any relative bilateral breast cancer Unknown   Any male have breast cancer Yes   Any ONE woman have BOTH breast AND ovarian cancer Unknown   Any woman with breast cancer before 50yrs Yes   2 or more relatives with breast AND/OR ovarian cancer Yes   2 or more relatives with breast AND/OR bowel cancer Unknown        Mammogram Screening - Mammogram every 1-2 years updated in Health Maintenance based on mutual decision making        5/28/2024   STI Screening   New sexual partner(s) since last STI/HIV test? No     History of abnormal Pap smear: No - age 28- 62 PAP with HPV every 5 years recommended        Latest Ref Rng & Units 11/22/2022     7:19 AM 4/17/2017     4:41 PM 4/17/2017     4:26 PM   PAP / HPV   PAP  Negative for Intraepithelial Lesion or Malignancy (NILM)      PAP (Historical)    NIL    HPV 16 DNA Negative Negative  Negative     HPV 18 DNA Negative Negative  Negative     Other HR HPV Negative Negative  Negative       ASCVD Risk   The 10-year ASCVD risk score (Emeterio LEOS, et al., 2019) is: 0.5%    Values used to calculate the score:      Age: 43 years      Sex: Female      Is Non- : No      Diabetic: No      Tobacco smoker: No      Systolic Blood Pressure: 129 mmHg      Is BP treated: No      HDL Cholesterol: 67 mg/dL      Total Cholesterol: 186 mg/dL       Reviewed and updated as needed this visit by Provider   Tobacco  Allergies  Meds  Problems  Med Hx  Surg Hx  Fam Hx               Objective    Exam  /88 (BP Location: Right arm, Patient Position: Sitting, Cuff Size: Adult Large)   Pulse 76   Temp 97.2  F (36.2  C) (Temporal)   Resp 16   Ht 1.664 m (5' 5.5\")   Wt 100.1 kg (220 lb 11.2 oz)   LMP 05/15/2024 (Approximate)   SpO2 100%   Breastfeeding No   BMI 36.17 kg/m     Estimated body mass index is 36.17 kg/m  as calculated from the " "following:    Height as of this encounter: 1.664 m (5' 5.5\").    Weight as of this encounter: 100.1 kg (220 lb 11.2 oz).    Physical Exam  GENERAL: alert and no distress  EYES: Eyes grossly normal to inspection, PERRL and conjunctivae and sclerae normal  HENT: ear canals and TM's normal, nose and mouth without ulcers or lesions  NECK: no adenopathy, no asymmetry, masses, or scars  RESP: lungs clear to auscultation - no rales, rhonchi or wheezes  BREAST: normal without masses, tenderness or nipple discharge and no palpable axillary masses or adenopathy  CV: regular rate and rhythm, normal S1 S2, no S3 or S4, no murmur, click or rub, no peripheral edema  ABDOMEN: soft, nontender, no hepatosplenomegaly, no masses and bowel sounds normal  MS: no gross musculoskeletal defects noted, no edema  SKIN: no suspicious lesions or rashes  NEURO: Normal strength and tone, mentation intact and speech normal  PSYCH: mentation appears normal, affect normal/bright        Signed Electronically by: Ashlee Morris PA-C    "

## 2024-05-30 LAB
ANION GAP SERPL CALCULATED.3IONS-SCNC: 10 MMOL/L (ref 7–15)
BUN SERPL-MCNC: 12.4 MG/DL (ref 6–20)
CALCIUM SERPL-MCNC: 9.5 MG/DL (ref 8.6–10)
CHLORIDE SERPL-SCNC: 105 MMOL/L (ref 98–107)
CHOLEST SERPL-MCNC: 195 MG/DL
CREAT SERPL-MCNC: 0.76 MG/DL (ref 0.51–0.95)
DEPRECATED HCO3 PLAS-SCNC: 21 MMOL/L (ref 22–29)
EGFRCR SERPLBLD CKD-EPI 2021: >90 ML/MIN/1.73M2
FASTING STATUS PATIENT QL REPORTED: NO
FASTING STATUS PATIENT QL REPORTED: NO
FERRITIN SERPL-MCNC: 9 NG/ML (ref 6–175)
GLUCOSE SERPL-MCNC: 83 MG/DL (ref 70–99)
HDLC SERPL-MCNC: 78 MG/DL
IRON BINDING CAPACITY (ROCHE): 363 UG/DL (ref 240–430)
IRON SATN MFR SERPL: 7 % (ref 15–46)
IRON SERPL-MCNC: 24 UG/DL (ref 37–145)
LDLC SERPL CALC-MCNC: 93 MG/DL
NONHDLC SERPL-MCNC: 117 MG/DL
POTASSIUM SERPL-SCNC: 4.9 MMOL/L (ref 3.4–5.3)
SODIUM SERPL-SCNC: 136 MMOL/L (ref 135–145)
TRIGL SERPL-MCNC: 118 MG/DL

## 2024-06-21 LAB — NONINV COLON CA DNA+OCC BLD SCRN STL QL: NEGATIVE

## 2024-07-03 ENCOUNTER — ANCILLARY PROCEDURE (OUTPATIENT)
Dept: MAMMOGRAPHY | Facility: CLINIC | Age: 44
End: 2024-07-03
Attending: PHYSICIAN ASSISTANT
Payer: COMMERCIAL

## 2024-07-03 DIAGNOSIS — Z12.31 VISIT FOR SCREENING MAMMOGRAM: ICD-10-CM

## 2024-07-03 PROCEDURE — 77063 BREAST TOMOSYNTHESIS BI: CPT | Mod: TC | Performed by: RADIOLOGY

## 2024-07-03 PROCEDURE — 77067 SCR MAMMO BI INCL CAD: CPT | Mod: TC | Performed by: RADIOLOGY

## 2025-07-06 ENCOUNTER — HEALTH MAINTENANCE LETTER (OUTPATIENT)
Age: 45
End: 2025-07-06

## 2025-08-17 DIAGNOSIS — K22.0 ACHALASIA: ICD-10-CM

## 2025-08-17 DIAGNOSIS — R11.0 NAUSEA: ICD-10-CM

## 2025-08-18 RX ORDER — METOCLOPRAMIDE 5 MG/1
TABLET ORAL
Qty: 30 TABLET | Refills: 0 | Status: SHIPPED | OUTPATIENT
Start: 2025-08-18

## (undated) DEVICE — DRSG BANDAID 1X3" FABRIC CURITY LATEX FREE KC44101

## (undated) DEVICE — LINEN TOWEL PACK X5 5464

## (undated) DEVICE — ENDO DISSECTOR KITTNER CIGARETTE ROLL4"X4" 15505/25

## (undated) DEVICE — DAVINCI XI HANDPIECE ESU VESSEL SEALER 8MM EXT 480422

## (undated) DEVICE — SU MONOCRYL 4-0 PS-2 18" UND Y496G

## (undated) DEVICE — DAVINCI XI DRAPE ARM 470015

## (undated) DEVICE — SOL WATER IRRIG 1000ML BOTTLE 2F7114

## (undated) DEVICE — DAVINCI HOT SHEARS TIP COVER  400180

## (undated) DEVICE — NDL INSUFFLATION 13GA 120MM C2201

## (undated) DEVICE — GLOVE PROTEXIS W/NEU-THERA 7.0  2D73TE70

## (undated) DEVICE — ADH SKIN CLOSURE PREMIERPRO EXOFIN 1.0ML 3470

## (undated) DEVICE — ENDO TROCAR FIRST ENTRY KII FIOS Z-THRD 12X100MM CTF73

## (undated) DEVICE — ENDO TROCAR FIRST ENTRY KII FIOS Z-THRD 05X100MM CTF03

## (undated) DEVICE — LUBRICANT INST ELECTROLUBE EL101

## (undated) DEVICE — SU VICRYL 0 UR-6 27" J603H

## (undated) DEVICE — Device

## (undated) DEVICE — SYSTEM LAPAROVUE VISIBILITY LAPVUE10

## (undated) DEVICE — DRAPE SHEET REV FOLD 3/4 9349

## (undated) DEVICE — DAVINCI XI GRASPER ENDOWRIST PROGRASP 470093

## (undated) DEVICE — DAVINCI XI OBTURATOR BLADELESS 8MM 470359

## (undated) DEVICE — PREP CHLORAPREP 26ML TINTED HI-LITE ORANGE 930815

## (undated) DEVICE — SU VICRYL 2-0 SH 27" J317H

## (undated) DEVICE — GOWN IMPERVIOUS SPECIALTY XL/XLONG 39049

## (undated) DEVICE — LIGHT HANDLE X2

## (undated) DEVICE — DAVINCI XI NDL DRIVER MEGA SUTURE CUT 8MM 470309

## (undated) DEVICE — DRAIN PENROSE 0.25"X18" LATEX FREE GR201

## (undated) DEVICE — DAVINCI XI DRAPE COLUMN 470341

## (undated) DEVICE — BLADE KNIFE SURG 11 371111

## (undated) DEVICE — DRAPE BREAST/CHEST 29420

## (undated) DEVICE — DAVINCI XI GRASPER FENESTRATED TIP UP 8MM 470347

## (undated) DEVICE — SU ETHIBOND 2-0 CT-2 30" X411H

## (undated) DEVICE — DAVINCI XI SEAL UNIVERSAL 5-8MM 470361

## (undated) DEVICE — GLOVE PROTEXIS BLUE W/NEU-THERA 7.0  2D73EB70

## (undated) DEVICE — PACK LAP CHOLE SLC15LCFSD

## (undated) DEVICE — ESU GROUND PAD UNIVERSAL W/O CORD

## (undated) DEVICE — SUCTION IRRIGATION STRYKFLOW II W/TIP DISP 250-070-520

## (undated) RX ORDER — FENTANYL CITRATE 50 UG/ML
INJECTION, SOLUTION INTRAMUSCULAR; INTRAVENOUS
Status: DISPENSED
Start: 2022-08-11

## (undated) RX ORDER — DEXAMETHASONE SODIUM PHOSPHATE 4 MG/ML
INJECTION, SOLUTION INTRA-ARTICULAR; INTRALESIONAL; INTRAMUSCULAR; INTRAVENOUS; SOFT TISSUE
Status: DISPENSED
Start: 2022-08-11

## (undated) RX ORDER — LIDOCAINE HYDROCHLORIDE 20 MG/ML
INJECTION, SOLUTION EPIDURAL; INFILTRATION; INTRACAUDAL; PERINEURAL
Status: DISPENSED
Start: 2022-08-11

## (undated) RX ORDER — NEOSTIGMINE METHYLSULFATE 1 MG/ML
VIAL (ML) INJECTION
Status: DISPENSED
Start: 2022-08-11

## (undated) RX ORDER — HEPARIN SODIUM 5000 [USP'U]/.5ML
INJECTION, SOLUTION INTRAVENOUS; SUBCUTANEOUS
Status: DISPENSED
Start: 2022-08-11

## (undated) RX ORDER — ONDANSETRON 2 MG/ML
INJECTION INTRAMUSCULAR; INTRAVENOUS
Status: DISPENSED
Start: 2022-08-11

## (undated) RX ORDER — FENTANYL CITRATE 50 UG/ML
INJECTION, SOLUTION INTRAMUSCULAR; INTRAVENOUS
Status: DISPENSED
Start: 2021-09-03

## (undated) RX ORDER — GLYCOPYRROLATE 0.2 MG/ML
INJECTION, SOLUTION INTRAMUSCULAR; INTRAVENOUS
Status: DISPENSED
Start: 2022-08-11

## (undated) RX ORDER — HYDROMORPHONE HYDROCHLORIDE 1 MG/ML
INJECTION, SOLUTION INTRAMUSCULAR; INTRAVENOUS; SUBCUTANEOUS
Status: DISPENSED
Start: 2022-08-11

## (undated) RX ORDER — VECURONIUM BROMIDE 1 MG/ML
INJECTION, POWDER, LYOPHILIZED, FOR SOLUTION INTRAVENOUS
Status: DISPENSED
Start: 2022-08-11

## (undated) RX ORDER — PROPOFOL 10 MG/ML
INJECTION, EMULSION INTRAVENOUS
Status: DISPENSED
Start: 2022-08-11

## (undated) RX ORDER — BUPIVACAINE HYDROCHLORIDE AND EPINEPHRINE 5; 5 MG/ML; UG/ML
INJECTION, SOLUTION EPIDURAL; INTRACAUDAL; PERINEURAL
Status: DISPENSED
Start: 2022-08-11

## (undated) RX ORDER — CEFAZOLIN SODIUM/WATER 2 G/20 ML
SYRINGE (ML) INTRAVENOUS
Status: DISPENSED
Start: 2022-08-11

## (undated) RX ORDER — EPINEPHRINE 1 MG/ML
INJECTION, SOLUTION INTRAMUSCULAR; SUBCUTANEOUS
Status: DISPENSED
Start: 2022-08-11